# Patient Record
Sex: MALE | Race: OTHER | NOT HISPANIC OR LATINO
[De-identification: names, ages, dates, MRNs, and addresses within clinical notes are randomized per-mention and may not be internally consistent; named-entity substitution may affect disease eponyms.]

---

## 2017-02-08 ENCOUNTER — OTHER (OUTPATIENT)
Age: 82
End: 2017-02-08

## 2017-02-08 ENCOUNTER — APPOINTMENT (OUTPATIENT)
Dept: UROLOGY | Facility: CLINIC | Age: 82
End: 2017-02-08

## 2017-03-10 ENCOUNTER — OUTPATIENT (OUTPATIENT)
Dept: OUTPATIENT SERVICES | Facility: HOSPITAL | Age: 82
LOS: 1 days | Discharge: HOME | End: 2017-03-10

## 2017-03-10 DIAGNOSIS — Z02.1 ENCOUNTER FOR PRE-EMPLOYMENT EXAMINATION: ICD-10-CM

## 2017-03-11 LAB
VZV IGG FLD QL IA: 3071 INDEX — SIGNIFICANT CHANGE UP
VZV IGG FLD QL IA: POSITIVE — SIGNIFICANT CHANGE UP

## 2017-06-23 ENCOUNTER — OUTPATIENT (OUTPATIENT)
Dept: OUTPATIENT SERVICES | Facility: HOSPITAL | Age: 82
LOS: 1 days | End: 2017-06-23
Payer: MEDICARE

## 2017-06-23 ENCOUNTER — APPOINTMENT (OUTPATIENT)
Dept: UROLOGY | Facility: CLINIC | Age: 82
End: 2017-06-23

## 2017-06-23 DIAGNOSIS — R35.0 FREQUENCY OF MICTURITION: ICD-10-CM

## 2017-06-23 PROCEDURE — 76775 US EXAM ABDO BACK WALL LIM: CPT

## 2017-06-27 DIAGNOSIS — N20.0 CALCULUS OF KIDNEY: ICD-10-CM

## 2017-06-27 DIAGNOSIS — N40.1 BENIGN PROSTATIC HYPERPLASIA WITH LOWER URINARY TRACT SYMPTOMS: ICD-10-CM

## 2017-12-22 ENCOUNTER — APPOINTMENT (OUTPATIENT)
Dept: UROLOGY | Facility: CLINIC | Age: 82
End: 2017-12-22
Payer: MEDICARE

## 2017-12-22 ENCOUNTER — OUTPATIENT (OUTPATIENT)
Dept: OUTPATIENT SERVICES | Facility: HOSPITAL | Age: 82
LOS: 1 days | End: 2017-12-22
Payer: MEDICARE

## 2017-12-22 DIAGNOSIS — R35.0 FREQUENCY OF MICTURITION: ICD-10-CM

## 2017-12-22 PROCEDURE — 76775 US EXAM ABDO BACK WALL LIM: CPT | Mod: 26

## 2017-12-22 PROCEDURE — 99213 OFFICE O/P EST LOW 20 MIN: CPT | Mod: 25

## 2017-12-22 PROCEDURE — 76775 US EXAM ABDO BACK WALL LIM: CPT

## 2017-12-26 DIAGNOSIS — N20.0 CALCULUS OF KIDNEY: ICD-10-CM

## 2018-12-19 ENCOUNTER — APPOINTMENT (OUTPATIENT)
Dept: UROLOGY | Facility: CLINIC | Age: 83
End: 2018-12-19

## 2019-03-20 ENCOUNTER — APPOINTMENT (OUTPATIENT)
Dept: UROLOGY | Facility: CLINIC | Age: 84
End: 2019-03-20
Payer: MEDICARE

## 2019-03-20 ENCOUNTER — OUTPATIENT (OUTPATIENT)
Dept: OUTPATIENT SERVICES | Facility: HOSPITAL | Age: 84
LOS: 1 days | End: 2019-03-20
Payer: MEDICARE

## 2019-03-20 VITALS
DIASTOLIC BLOOD PRESSURE: 71 MMHG | HEIGHT: 67 IN | TEMPERATURE: 98 F | SYSTOLIC BLOOD PRESSURE: 167 MMHG | RESPIRATION RATE: 17 BRPM | HEART RATE: 51 BPM | WEIGHT: 140 LBS | BODY MASS INDEX: 21.97 KG/M2

## 2019-03-20 DIAGNOSIS — R35.0 FREQUENCY OF MICTURITION: ICD-10-CM

## 2019-03-20 PROCEDURE — 99213 OFFICE O/P EST LOW 20 MIN: CPT | Mod: 25

## 2019-03-20 PROCEDURE — 51798 US URINE CAPACITY MEASURE: CPT

## 2019-03-20 PROCEDURE — 76775 US EXAM ABDO BACK WALL LIM: CPT | Mod: 26

## 2019-03-20 PROCEDURE — 76775 US EXAM ABDO BACK WALL LIM: CPT

## 2019-03-20 NOTE — ADDENDUM
[FreeTextEntry1] :  I, Yazmin Rushing, acted solely as a scribe for Dr. Orville Olea. The documentation recorded by the scribe accurately reflects the service I personally performed and the decision by me.\par

## 2019-03-20 NOTE — HISTORY OF PRESENT ILLNESS
[FreeTextEntry1] : 83 year old male presents for evaluation of test results.\par Prostate volume in 2013 was 86 cc on MRI pelvis, but no nodules seen and his last PSA in 2016 was 7.42.\par In view of his age, we suggested we stop doing his PSA screenings unless he is symptomatic.\par Pt has a good stream, but some intermittency and occasional urge incontinence.\par However, he is not bothered by these symptoms.\par PVR today was 5 mL.\par \par On US renal today, pt has bilateral renal cysts of no significance. He also has some calcification on the left lower pole.\par LL from 03/09/19 showed elevated Ox levels, but otherwise, great results. \par Advised pt to reduce Ox intake.\par LL in 5 months and RTO in 6 months for results and US

## 2019-03-20 NOTE — ASSESSMENT
[FreeTextEntry1] : Advised pt to reduce Ox intake.\par LL in 5 months and RTO in 6 months for results and US\par

## 2019-03-25 DIAGNOSIS — N20.0 CALCULUS OF KIDNEY: ICD-10-CM

## 2019-03-25 DIAGNOSIS — N40.1 BENIGN PROSTATIC HYPERPLASIA WITH LOWER URINARY TRACT SYMPTOMS: ICD-10-CM

## 2019-06-06 ENCOUNTER — APPOINTMENT (OUTPATIENT)
Dept: UROLOGY | Facility: CLINIC | Age: 84
End: 2019-06-06
Payer: MEDICARE

## 2019-06-06 ENCOUNTER — OUTPATIENT (OUTPATIENT)
Dept: OUTPATIENT SERVICES | Facility: HOSPITAL | Age: 84
LOS: 1 days | End: 2019-06-06
Payer: MEDICARE

## 2019-06-06 DIAGNOSIS — R35.0 FREQUENCY OF MICTURITION: ICD-10-CM

## 2019-06-06 PROCEDURE — 99213 OFFICE O/P EST LOW 20 MIN: CPT | Mod: 25

## 2019-06-06 PROCEDURE — 76775 US EXAM ABDO BACK WALL LIM: CPT | Mod: 26

## 2019-06-06 PROCEDURE — 76775 US EXAM ABDO BACK WALL LIM: CPT

## 2019-06-06 NOTE — HISTORY OF PRESENT ILLNESS
[FreeTextEntry1] : 83 year old male presents for evaluation of test results.\par Prostate volume in 2013 was 86 cc on MRI pelvis, but no nodules seen and his last PSA in 2016 was 7.42. Repeated PSA at outside institution  was 8.55.\par In view of his age, we suggested we stop doing his PSA screenings unless he is symptomatic.\par US renal from 03/20/19 shows bilateral cysts, but no stones. Previously he had stones in his bladder.\par Review of LL from 03/09/19 shows elevated Ox levels, but otherwise great results.\par Pathology of previous prostatectomy  on 06/2008 showed no evidence of cancer present\par At the present time he has stress incontinence, his stream is good. He states that he has bilateral pain on shoulders and arms. \par Will repeat PSA\par Bone scan to see if there is evidence of cancer.

## 2019-06-06 NOTE — ASSESSMENT
[FreeTextEntry1] : Advised to repeat PSA\par Will have a bone scan to detect if there is any cancer present. \par

## 2019-06-06 NOTE — PHYSICAL EXAM
[Prostate Size ___ gm] : prostate size [unfilled] gm [No Prostate Nodules] : no prostate nodules [FreeTextEntry1] : smooth and regular

## 2019-06-07 ENCOUNTER — APPOINTMENT (OUTPATIENT)
Dept: NUCLEAR MEDICINE | Facility: IMAGING CENTER | Age: 84
End: 2019-06-07

## 2019-06-07 ENCOUNTER — OTHER (OUTPATIENT)
Age: 84
End: 2019-06-07

## 2019-06-10 LAB — PSA SERPL-MCNC: 7.22 NG/ML

## 2019-06-12 DIAGNOSIS — R97.20 ELEVATED PROSTATE SPECIFIC ANTIGEN [PSA]: ICD-10-CM

## 2019-06-20 ENCOUNTER — FORM ENCOUNTER (OUTPATIENT)
Age: 84
End: 2019-06-20

## 2019-06-21 ENCOUNTER — APPOINTMENT (OUTPATIENT)
Dept: NUCLEAR MEDICINE | Facility: IMAGING CENTER | Age: 84
End: 2019-06-21
Payer: MEDICARE

## 2019-06-21 ENCOUNTER — OUTPATIENT (OUTPATIENT)
Dept: OUTPATIENT SERVICES | Facility: HOSPITAL | Age: 84
LOS: 1 days | End: 2019-06-21
Payer: MEDICARE

## 2019-06-21 DIAGNOSIS — R97.20 ELEVATED PROSTATE SPECIFIC ANTIGEN [PSA]: ICD-10-CM

## 2019-06-21 PROCEDURE — A9561: CPT

## 2019-06-21 PROCEDURE — 78320: CPT | Mod: 26

## 2019-06-21 PROCEDURE — 78306 BONE IMAGING WHOLE BODY: CPT | Mod: 26

## 2019-06-21 PROCEDURE — 78306 BONE IMAGING WHOLE BODY: CPT

## 2019-06-21 PROCEDURE — 78803 RP LOCLZJ TUM SPECT 1 AREA: CPT

## 2019-06-21 PROCEDURE — 78999 UNLISTED MISC PX DX NUC MED: CPT

## 2019-06-26 ENCOUNTER — APPOINTMENT (OUTPATIENT)
Dept: UROLOGY | Facility: CLINIC | Age: 84
End: 2019-06-26
Payer: MEDICARE

## 2019-06-26 DIAGNOSIS — C61 MALIGNANT NEOPLASM OF PROSTATE: ICD-10-CM

## 2019-06-26 PROCEDURE — 99213 OFFICE O/P EST LOW 20 MIN: CPT

## 2019-06-26 NOTE — HISTORY OF PRESENT ILLNESS
[FreeTextEntry1] : 83 year old male presents for evaluation of test results.\par Prostate volume in 2013 was 86 cc on MRI pelvis, but no nodules seen\par PSA 7.22 on 06/06/19 and has been consistent since 2010.\par US renal from 03/20/19 shows bilateral cysts, but no stones. 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

## 2019-06-26 NOTE — ASSESSMENT
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

## 2019-09-19 ENCOUNTER — APPOINTMENT (OUTPATIENT)
Dept: UROLOGY | Facility: CLINIC | Age: 84
End: 2019-09-19
Payer: MEDICARE

## 2019-09-19 PROCEDURE — 99213 OFFICE O/P EST LOW 20 MIN: CPT

## 2019-09-19 NOTE — PHYSICAL EXAM
[General Appearance - Well Developed] : well developed [General Appearance - Well Nourished] : well nourished [Normal Appearance] : normal appearance [Well Groomed] : well groomed [General Appearance - In No Acute Distress] : no acute distress [Abdomen Soft] : soft [Abdomen Tenderness] : non-tender [Costovertebral Angle Tenderness] : no ~M costovertebral angle tenderness [Urethral Meatus] : meatus normal [Urinary Bladder Findings] : the bladder was normal on palpation [Scrotum] : the scrotum was normal [Testes Mass (___cm)] : there were no testicular masses [No Prostate Nodules] : no prostate nodules [Prostate Size ___ gm] : prostate size [unfilled] gm [Edema] : no peripheral edema [] : no respiratory distress [Respiration, Rhythm And Depth] : normal respiratory rhythm and effort [Exaggerated Use Of Accessory Muscles For Inspiration] : no accessory muscle use [Oriented To Time, Place, And Person] : oriented to person, place, and time [Affect] : the affect was normal [Mood] : the mood was normal [Not Anxious] : not anxious [Normal Station and Gait] : the gait and station were normal for the patient's age [No Focal Deficits] : no focal deficits [No Palpable Adenopathy] : no palpable adenopathy [FreeTextEntry1] : smooth and regular

## 2019-09-19 NOTE — HISTORY OF PRESENT ILLNESS
[FreeTextEntry1] : 83 year old male with CaP presents for evaluation of test results.\par Nocturia x3-4 and drinks a lot of coffee.\par \par Prostate volume in 2013 was 86 cc on MRI pelvis, but no nodules seen\par PSA 7.22 on 06/06/19 and has been consistent since 2010.\par US renal from 06/06/19 again visualized bilateral simple cysts the largest exophytic right upper pole measures 3.2 x 2.8 x 3.7 cm and multiple small scattered simple cysts in the left kidney the largest is medial mid and measures 2.0 x 1.7 cm. Again visualized a left kidney lower pole parenchymal calcification measuring 6.8 mm. Both kidneys are normal in size and echogenicity without obvious stones, hydronephrosis or solid masses visualized. 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 Overall pt is doing well. \par LL in 5 months and RTO in 6 months for results and US

## 2019-09-19 NOTE — ADDENDUM
[FreeTextEntry1] :  I, Yazmin Rushing, acted solely as a scribe for Dr. Orville Olea. The documentation recorded by the scribe accurately reflects the service I personally performed and the decision by me.

## 2019-09-19 NOTE — ASSESSMENT
[FreeTextEntry1] : Prostate volume in 2013 was 86 cc on MRI pelvis, but no nodules seen\par PSA 7.22 on 06/06/19 and has been consistent since 2010.\par US renal from 06/06/19 again visualized bilateral simple cysts the largest exophytic right upper pole measures 3.2 x 2.8 x 3.7 cm and multiple small scattered simple cysts in the left kidney the largest is medial mid and measures 2.0 x 1.7 cm. Again visualized a left kidney lower pole parenchymal calcification measuring 6.8 mm. Both kidneys are normal in size and echogenicity without obvious stones, hydronephrosis or solid masses visualized. 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 Overall pt is doing well. \par LL in 5 months and RTO in 6 months for results and US

## 2020-03-23 ENCOUNTER — APPOINTMENT (OUTPATIENT)
Dept: UROLOGY | Facility: CLINIC | Age: 85
End: 2020-03-23

## 2020-05-22 ENCOUNTER — APPOINTMENT (OUTPATIENT)
Dept: UROLOGY | Facility: CLINIC | Age: 85
End: 2020-05-22

## 2020-06-05 ENCOUNTER — APPOINTMENT (OUTPATIENT)
Dept: UROLOGY | Facility: CLINIC | Age: 85
End: 2020-06-05

## 2020-07-13 ENCOUNTER — APPOINTMENT (OUTPATIENT)
Dept: UROLOGY | Facility: CLINIC | Age: 85
End: 2020-07-13
Payer: MEDICARE

## 2020-07-13 ENCOUNTER — APPOINTMENT (OUTPATIENT)
Dept: UROLOGY | Facility: CLINIC | Age: 85
End: 2020-07-13

## 2020-07-13 PROCEDURE — 99442: CPT | Mod: 95

## 2020-08-06 LAB — PSA SERPL-MCNC: 7.42 NG/ML

## 2021-04-06 ENCOUNTER — APPOINTMENT (OUTPATIENT)
Dept: GASTROENTEROLOGY | Facility: CLINIC | Age: 86
End: 2021-04-06
Payer: MEDICARE

## 2021-04-06 VITALS
HEIGHT: 67 IN | WEIGHT: 135 LBS | DIASTOLIC BLOOD PRESSURE: 89 MMHG | BODY MASS INDEX: 21.19 KG/M2 | SYSTOLIC BLOOD PRESSURE: 158 MMHG | HEART RATE: 68 BPM

## 2021-04-06 DIAGNOSIS — K58.2 MIXED IRRITABLE BOWEL SYNDROME: ICD-10-CM

## 2021-04-06 PROCEDURE — 99212 OFFICE O/P EST SF 10 MIN: CPT

## 2021-04-06 NOTE — ASSESSMENT
[FreeTextEntry1] : 86 yo male with alternating constipation and diarrhea. Patient encouraged to continue current regimen and call with any concerns.

## 2021-04-06 NOTE — HISTORY OF PRESENT ILLNESS
[de-identified] : Mr. RAZA BECK is a 85 year old male with history of alternating constipation and diarrhea. Patient has been managing his symptoms with the use of p.r.n. MiraLax and Metamucil. There has been no bleeding or no other significant changes in medical history. Last colonoscopy was in 2018.\par

## 2021-06-10 LAB — PSA SERPL-MCNC: 7.25 NG/ML

## 2021-06-11 ENCOUNTER — APPOINTMENT (OUTPATIENT)
Dept: UROLOGY | Facility: CLINIC | Age: 86
End: 2021-06-11
Payer: MEDICARE

## 2021-06-11 ENCOUNTER — OUTPATIENT (OUTPATIENT)
Dept: OUTPATIENT SERVICES | Facility: HOSPITAL | Age: 86
LOS: 1 days | End: 2021-06-11
Payer: MEDICARE

## 2021-06-11 VITALS
DIASTOLIC BLOOD PRESSURE: 87 MMHG | WEIGHT: 135 LBS | RESPIRATION RATE: 17 BRPM | TEMPERATURE: 97.6 F | HEART RATE: 56 BPM | BODY MASS INDEX: 21.19 KG/M2 | SYSTOLIC BLOOD PRESSURE: 144 MMHG | HEIGHT: 67 IN

## 2021-06-11 PROCEDURE — 76775 US EXAM ABDO BACK WALL LIM: CPT | Mod: 26

## 2021-06-11 PROCEDURE — 76775 US EXAM ABDO BACK WALL LIM: CPT

## 2021-06-11 PROCEDURE — 99213 OFFICE O/P EST LOW 20 MIN: CPT

## 2021-06-11 NOTE — PHYSICAL EXAM
[General Appearance - Well Developed] : well developed [General Appearance - Well Nourished] : well nourished [Normal Appearance] : normal appearance [Well Groomed] : well groomed [General Appearance - In No Acute Distress] : no acute distress [Abdomen Soft] : soft [Abdomen Tenderness] : non-tender [Edema] : no peripheral edema [] : no respiratory distress [Respiration, Rhythm And Depth] : normal respiratory rhythm and effort [Exaggerated Use Of Accessory Muscles For Inspiration] : no accessory muscle use [Oriented To Time, Place, And Person] : oriented to person, place, and time [Affect] : the affect was normal [Mood] : the mood was normal [Not Anxious] : not anxious [Normal Station and Gait] : the gait and station were normal for the patient's age [No Focal Deficits] : no focal deficits [No Palpable Adenopathy] : no palpable adenopathy [No Prostate Nodules] : no prostate nodules [Prostate Size ___ gm] : prostate size [unfilled] gm [FreeTextEntry1] : The digital rectal exam was normal with a 50g firm prostate. There were no nodules.

## 2021-07-07 DIAGNOSIS — Z87.442 PERSONAL HISTORY OF URINARY CALCULI: ICD-10-CM

## 2021-07-07 DIAGNOSIS — C61 MALIGNANT NEOPLASM OF PROSTATE: ICD-10-CM

## 2021-07-29 ENCOUNTER — APPOINTMENT (OUTPATIENT)
Dept: OPHTHALMOLOGY | Facility: CLINIC | Age: 86
End: 2021-07-29
Payer: MEDICARE

## 2021-07-29 ENCOUNTER — NON-APPOINTMENT (OUTPATIENT)
Age: 86
End: 2021-07-29

## 2021-07-29 PROCEDURE — 92014 COMPRE OPH EXAM EST PT 1/>: CPT

## 2021-07-29 PROCEDURE — 92133 CPTRZD OPH DX IMG PST SGM ON: CPT

## 2021-11-08 LAB
PSA FREE FLD-MCNC: 29 %
PSA FREE SERPL-MCNC: 2.11 NG/ML
PSA SERPL-MCNC: 7.32 NG/ML

## 2021-11-16 ENCOUNTER — APPOINTMENT (OUTPATIENT)
Dept: UROLOGY | Facility: CLINIC | Age: 86
End: 2021-11-16
Payer: MEDICARE

## 2021-11-16 ENCOUNTER — OUTPATIENT (OUTPATIENT)
Dept: OUTPATIENT SERVICES | Facility: HOSPITAL | Age: 86
LOS: 1 days | End: 2021-11-16
Payer: MEDICARE

## 2021-11-16 DIAGNOSIS — Z87.442 PERSONAL HISTORY OF URINARY CALCULI: ICD-10-CM

## 2021-11-16 DIAGNOSIS — R35.0 FREQUENCY OF MICTURITION: ICD-10-CM

## 2021-11-16 PROCEDURE — 76775 US EXAM ABDO BACK WALL LIM: CPT

## 2021-11-16 PROCEDURE — 76775 US EXAM ABDO BACK WALL LIM: CPT | Mod: 26

## 2021-11-16 PROCEDURE — 99213 OFFICE O/P EST LOW 20 MIN: CPT

## 2021-11-16 NOTE — ASSESSMENT
[FreeTextEntry1] : The patient is an 86 year old male presenting today for a follow up visit for kidney stones. \par His PSA from 11/3/21 was 7.32 ng/mL. \par He reports that he has had occasional urge incontinence, but recently he reports he feels that he has been urinating better. \par He also notes that he has a good urinary stream and voids large volumes each time he passes urine. \par Admits to feelings of incomplete emptying. \par \par His PVR was 0 mL. \par \par The digital rectal exam showed a 50 g + prostate. There were no nodules.\par \par The [facility] from 10/26/21 showed very good results. \par \par His renal US from 11/16/2021 demonstrated:\par Right kidney: 9.8 x 5.4 x 4.9 cm \par Cortical Thickness: up 1.3 cm lp 1.5 cm \par \par Left kidney: 10.1 x 5.1 x 4.8 cm\par Cortical Thickness: up 1.4 cm lp 1.4 cm \par \par Echogenicity: Normal\par \par Bladder: Not scanned\par \par Findings: Previous;y visualized bilateral fulness or renal pelvises appears to be resolved. Again visualized bilateral non vascular cysts including a cyst with debris in the lower pole 1.6 x 1.3 cm and the largest cyst in the right kidney lateral upper 3.4 x 3.6 x 3.5 cm and largest in the left kidney medial anterior mid 2.6 x 2.5 x 2.5 cm. Both kidneys are normal in size and echogenicity without stones, hydronephrosis or solid masses visualized.\par \par I encouraged the patient to continue hydrating well. \par \par The patient will complete a LL in 5 months. \par He will have a renal US in 6 months. \par \par The patient will return to the office in 6 months. \par \par I spent 25 minutes with the patient.

## 2021-11-16 NOTE — PHYSICAL EXAM
[General Appearance - Well Developed] : well developed [General Appearance - Well Nourished] : well nourished [Normal Appearance] : normal appearance [Well Groomed] : well groomed [General Appearance - In No Acute Distress] : no acute distress [Abdomen Soft] : soft [Abdomen Tenderness] : non-tender [Edema] : no peripheral edema [] : no respiratory distress [Respiration, Rhythm And Depth] : normal respiratory rhythm and effort [Exaggerated Use Of Accessory Muscles For Inspiration] : no accessory muscle use [Oriented To Time, Place, And Person] : oriented to person, place, and time [Affect] : the affect was normal [Not Anxious] : not anxious [Mood] : the mood was normal [Normal Station and Gait] : the gait and station were normal for the patient's age [No Focal Deficits] : no focal deficits [No Palpable Adenopathy] : no palpable adenopathy [No Prostate Nodules] : no prostate nodules [Prostate Size ___ gm] : prostate size [unfilled] gm [FreeTextEntry1] : The digital rectal exam showed a 50 g + prostate. There were no nodules.

## 2021-11-16 NOTE — HISTORY OF PRESENT ILLNESS
[FreeTextEntry1] : The patient is an 86 year old male presenting today for a follow up visit for kidney stones. \par His PSA from 11/3/21 was 7.32 ng/mL. \par He reports that he has had occasional urge incontinence, but recently he reports he feels that he has been urinating better. \par He also notes that he has a good urinary stream and voids large volumes each time he passes urine. \par Admits to feelings of incomplete emptying. \par \par His PVR was 0 mL. \par \par The digital rectal exam showed a 50 g + prostate. There were no nodules.\par \par The Litholink from 10/26/21 showed very good results. \par \par His renal US from 11/16/2021 demonstrated:\par Right kidney: 9.8 x 5.4 x 4.9 cm \par Cortical Thickness: up 1.3 cm lp 1.5 cm \par \par Left kidney: 10.1 x 5.1 x 4.8 cm\par Cortical Thickness: up 1.4 cm lp 1.4 cm \par \par Echogenicity: Normal\par \par Bladder: Not scanned\par \par Findings: Previous;y visualized bilateral fulness or renal pelvises appears to be resolved. Again visualized bilateral non vascular cysts including a cyst with debris in the lower pole 1.6 x 1.3 cm and the largest cyst in the right kidney lateral upper 3.4 x 3.6 x 3.5 cm and largest in the left kidney medial anterior mid 2.6 x 2.5 x 2.5 cm. Both kidneys are normal in size and echogenicity without stones, hydronephrosis or solid masses visualized.\par \par I encouraged the patient to continue hydrating well. \par \par The patient will complete a LL in 5 months. \par He will have a renal US in 6 months. \par \par The patient will return to the office in 6 months.

## 2021-11-18 ENCOUNTER — APPOINTMENT (OUTPATIENT)
Dept: OPHTHALMOLOGY | Facility: CLINIC | Age: 86
End: 2021-11-18
Payer: MEDICARE

## 2021-11-18 ENCOUNTER — NON-APPOINTMENT (OUTPATIENT)
Age: 86
End: 2021-11-18

## 2021-11-18 PROCEDURE — 92012 INTRM OPH EXAM EST PATIENT: CPT

## 2022-05-08 LAB
PSA FREE FLD-MCNC: 29 %
PSA FREE SERPL-MCNC: 1.98 NG/ML
PSA SERPL-MCNC: 6.93 NG/ML

## 2022-05-23 ENCOUNTER — APPOINTMENT (OUTPATIENT)
Dept: UROLOGY | Facility: CLINIC | Age: 87
End: 2022-05-23
Payer: MEDICARE

## 2022-05-23 VITALS
WEIGHT: 133 LBS | OXYGEN SATURATION: 98 % | HEIGHT: 66 IN | HEART RATE: 56 BPM | BODY MASS INDEX: 21.38 KG/M2 | DIASTOLIC BLOOD PRESSURE: 80 MMHG | SYSTOLIC BLOOD PRESSURE: 130 MMHG

## 2022-05-23 DIAGNOSIS — N13.8 BENIGN PROSTATIC HYPERPLASIA WITH LOWER URINARY TRACT SYMPMS: ICD-10-CM

## 2022-05-23 DIAGNOSIS — R97.20 ELEVATED PROSTATE, SPECIFIC ANTIGEN [PSA]: ICD-10-CM

## 2022-05-23 DIAGNOSIS — N40.1 BENIGN PROSTATIC HYPERPLASIA WITH LOWER URINARY TRACT SYMPMS: ICD-10-CM

## 2022-05-23 DIAGNOSIS — N20.0 CALCULUS OF KIDNEY: ICD-10-CM

## 2022-05-23 PROCEDURE — 99214 OFFICE O/P EST MOD 30 MIN: CPT

## 2022-05-23 NOTE — HISTORY OF PRESENT ILLNESS
[FreeTextEntry1] : 85 yo M for follow up\par saw Dr. Olea so far\par \par history of nephrolithiasis \par last LL 5/5/2022 stable with good results\par last sono with Dr. Olea 11/2021: Findings: Previous;y visualized bilateral fulness or renal pelvises appears to be resolved. Again visualized bilateral non vascular cysts including a cyst with debris in the lower pole 1.6 x 1.3 cm and the largest cyst in the right kidney lateral upper 3.4 x 3.6 x 3.5 cm and largest in the left kidney medial anterior mid 2.6 x 2.5 x 2.5 cm. Both kidneys are normal in size and echogenicity without stones, hydronephrosis or solid masses visualized.\par \par now feeling well\par recent PSA (5/6/2022) stable 6.93 (free 29%)\par He reports that he has had occasional urge incontinence, but recently he reports he feels that he has been urinating better. He also notes that he has a good urinary stream and voids large volumes each time he passes urine.  Admits to feelings of incomplete emptying. \par \par residual before voiding 140 ml, no urge to void\par PVR 0 ml\par bladder capacity is not small, and it seems he can empty well. encouraged the patient not to hold his urine for too long to avoid increase pressure and urgency, also suggested timed voiding might help\par \par plan:\par - next visit in 6 months with new LL and office ultrasound

## 2022-06-20 ENCOUNTER — APPOINTMENT (OUTPATIENT)
Dept: OPHTHALMOLOGY | Facility: CLINIC | Age: 87
End: 2022-06-20
Payer: MEDICARE

## 2022-06-20 ENCOUNTER — NON-APPOINTMENT (OUTPATIENT)
Age: 87
End: 2022-06-20

## 2022-06-20 PROCEDURE — 92133 CPTRZD OPH DX IMG PST SGM ON: CPT

## 2022-06-20 PROCEDURE — 92014 COMPRE OPH EXAM EST PT 1/>: CPT

## 2022-07-11 ENCOUNTER — APPOINTMENT (OUTPATIENT)
Dept: OPHTHALMOLOGY | Facility: CLINIC | Age: 87
End: 2022-07-11

## 2022-07-11 ENCOUNTER — NON-APPOINTMENT (OUTPATIENT)
Age: 87
End: 2022-07-11

## 2022-07-11 PROCEDURE — 92083 EXTENDED VISUAL FIELD XM: CPT

## 2022-07-11 PROCEDURE — 92250 FUNDUS PHOTOGRAPHY W/I&R: CPT

## 2022-07-11 PROCEDURE — 92012 INTRM OPH EXAM EST PATIENT: CPT

## 2022-07-21 ENCOUNTER — APPOINTMENT (OUTPATIENT)
Dept: OPHTHALMOLOGY | Facility: CLINIC | Age: 87
End: 2022-07-21

## 2022-09-15 ENCOUNTER — NON-APPOINTMENT (OUTPATIENT)
Age: 87
End: 2022-09-15

## 2022-09-15 ENCOUNTER — APPOINTMENT (OUTPATIENT)
Dept: OPHTHALMOLOGY | Facility: CLINIC | Age: 87
End: 2022-09-15

## 2022-09-15 PROCEDURE — 92014 COMPRE OPH EXAM EST PT 1/>: CPT

## 2022-09-26 ENCOUNTER — APPOINTMENT (OUTPATIENT)
Dept: OPHTHALMOLOGY | Facility: CLINIC | Age: 87
End: 2022-09-26

## 2022-11-07 ENCOUNTER — RESULT CHARGE (OUTPATIENT)
Age: 87
End: 2022-11-07

## 2022-11-09 ENCOUNTER — APPOINTMENT (OUTPATIENT)
Dept: CARDIOLOGY | Facility: CLINIC | Age: 87
End: 2022-11-09
Payer: MEDICARE

## 2022-11-09 ENCOUNTER — NON-APPOINTMENT (OUTPATIENT)
Age: 87
End: 2022-11-09

## 2022-11-09 VITALS
OXYGEN SATURATION: 99 % | HEIGHT: 66 IN | DIASTOLIC BLOOD PRESSURE: 75 MMHG | WEIGHT: 130 LBS | BODY MASS INDEX: 20.89 KG/M2 | RESPIRATION RATE: 16 BRPM | SYSTOLIC BLOOD PRESSURE: 137 MMHG

## 2022-11-09 PROCEDURE — 99205 OFFICE O/P NEW HI 60 MIN: CPT

## 2022-11-09 PROCEDURE — 99245 OFF/OP CONSLTJ NEW/EST HI 55: CPT

## 2022-11-09 PROCEDURE — 99072 ADDL SUPL MATRL&STAF TM PHE: CPT

## 2022-11-09 PROCEDURE — 93000 ELECTROCARDIOGRAM COMPLETE: CPT

## 2022-11-17 DIAGNOSIS — R94.39 ABNORMAL RESULT OF OTHER CARDIOVASCULAR FUNCTION STUDY: ICD-10-CM

## 2022-11-17 NOTE — ASSESSMENT
[FreeTextEntry1] : A/P\par \par -[facility] requested including\par echo, stress test, labs, CV report \par -Attemptted to contact Dr. Carranza at [facility]  [phone]\par When he contacts me will discuss\par >abnormal results (area of ischemia on stress test)\par >if CT coronary angio would be reasonable alternative\par >if pt will be following up here or going back [facility] after CAD workup.\par \par -Return in 2 weeks to review records\par -Will consider cardiac cath v. CT coronary angio.\par Cath may be preferable given likelihood of high calcification\par due to age on coronary CT Angio.\par \par ============================\par 11/16/'22:\par \par [facility]:\par Jul-22-'22: "medium size area of mild ischemia involving\par inferior wall associated w/ small area of defect in apex\par suggestive of infarction w/ mild per-infarction ischemia.\par LV enlarged. 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 Rajat Carranza, [facility] [place]:\par -Months ago pt was diagnosed w/ afib.\par EF reduced then. Cardioverted but EF didn't improve.\par Ischemic workup w/ stress pos for inferior ischemia.\par -Dr. Carranza prefers cath for further evaluation\par as EF is substantially reduced & tehre is a high\par suspicion for obstructive CAD that may need revascularization.\par -[facility] does not [facility] medical care beyond cath\par here at [facility].  If pt wants to cont.\par care w/ us it will be outside [facility] coverage.\par -----------\par Will plan for cath here. Discussed w/ Dr. Min & contact scheduling staff.\par \par \par Dr. Monsalve's coontact info:\par cell \[facility] office

## 2022-11-22 ENCOUNTER — APPOINTMENT (OUTPATIENT)
Dept: CARDIOLOGY | Facility: CLINIC | Age: 87
End: 2022-11-22

## 2022-11-22 NOTE — H&P ADULT - NSICDXPASTMEDICALHX_GEN_ALL_CORE_FT
PAST MEDICAL HISTORY:  Afib     DM (diabetes mellitus)     HLD (hyperlipidemia)     HTN (hypertension)

## 2022-11-22 NOTE — H&P ADULT - NSHPPHYSICALEXAM_GEN_ALL_CORE
PHYSICAL EXAM  GENERAL: NAD, AAOx3  HEAD:  Atraumatic, Normocephalic  EYES: EOMI, PERRLA, conjunctiva and sclera clear  NECK: Supple, No JVD, No LAD  CHEST/LUNG: Clear to auscultation bilaterally; No wheeze  HEART: IRR; No murmurs, rubs, or gallops  ABDOMEN: Soft, Nontender, Nondistended; Bowel sounds present X 4 quadrants   EXTREMITIES:  1+ Peripheral Pulses, No clubbing, cyanosis, or edema  SKIN: No rashes or lesions,  b/l LE not red, cool to touch,  no open skin no drainage  NEURO: nonfocal CN/motor/sensory/reflexes  Psych: normal affect and behavior, calm and cooperative

## 2022-11-22 NOTE — H&P ADULT - ASSESSMENT
86 y/o M with PMHx of AFIB on coumadin last dose 11/18,  CHF EF 40-50%, HLD, DM, HTN presented to cardiology to [facility]. Stress test done suggestive of CAD and cardiomyopathy. Referred for cardiac cath to further evaluate     ASA class:  Creatinine:  GFR:  Bleeding  Risk score:  Bj Score:  86 y/o M with PMHx of AFIB on coumadin last dose 11/18,  CHF EF 29%, HLD, DM, HTN presented to cardiology to [facility]. Stress test done suggestive of CAD and cardiomyopathy. Referred for cardiac cath to further evaluate     ASA class: II  Creatinine: 0.93  GFR: 79  Bleeding  Risk score: 1.7%  Bj Score:  13 pts

## 2022-11-22 NOTE — H&P ADULT - HISTORY OF PRESENT ILLNESS
86 y/o M with PMHx of AFIB on coumadin last dose 11/18,  CHF EF 40-50%, HLD, DM, HTN presented to cardiology to [facility]. Stress test done suggestive of CAD and cardiomyopathy. Referred for cardiac cath to further evaluate  86 y/o M with PMHx of AFIB on coumadin last dose 11/18,  CHF EF 29%, HLD, DM, HTN presented to cardiology to [facility]. Stress test done suggestive of CAD and cardiomyopathy. Referred for cardiac cath to further evaluate   Covid 19 PCR neg

## 2022-11-22 NOTE — H&P ADULT - NSHPLABSRESULTS_GEN_ALL_CORE
ECHO 6/22- EF 40-45%.   STRESS 7/22- inferior wall ischemia.   EKG 11/9/22 AFIB 103 EKG 11/9/22 AFIB 103    STRESS 7/22- inferior wall ischemia. EF 29%  ECHO 6/22- EF 40-45%.

## 2022-11-22 NOTE — H&P ADULT - PROBLEM SELECTOR PLAN 1
-plan for cardiac cath for ischemic work up  - -Consent obtained for cardiac catheterization w/ coronary angiogram and possible stent placement. Pt is competent, has capacity, and understands risks and benefits of procedure. Risks and benefits discussed. Risk discussed included, but not limited to MI, stroke, mortality, major bleeding, arrythmia, or infection. All questions answered -plan for cardiac cath for ischemic work up  -No prehydration 2/2 reduced EF  - -Consent obtained for cardiac catheterization w/ coronary angiogram and possible stent placement. Pt is competent, has capacity, and understands risks and benefits of procedure. Risks and benefits discussed. Risk discussed included, but not limited to MI, stroke, mortality, major bleeding, arrythmia, or infection. All questions answered

## 2022-11-23 ENCOUNTER — OUTPATIENT (OUTPATIENT)
Dept: OUTPATIENT SERVICES | Facility: HOSPITAL | Age: 87
LOS: 1 days | Discharge: HOME | End: 2022-11-23
Payer: MEDICARE

## 2022-11-23 VITALS
HEART RATE: 97 BPM | SYSTOLIC BLOOD PRESSURE: 131 MMHG | DIASTOLIC BLOOD PRESSURE: 78 MMHG | HEIGHT: 66 IN | TEMPERATURE: 98 F | WEIGHT: 125 LBS | RESPIRATION RATE: 20 BRPM

## 2022-11-23 VITALS
RESPIRATION RATE: 17 BRPM | SYSTOLIC BLOOD PRESSURE: 115 MMHG | DIASTOLIC BLOOD PRESSURE: 72 MMHG | HEART RATE: 90 BPM | OXYGEN SATURATION: 97 %

## 2022-11-23 DIAGNOSIS — I21.4 NON-ST ELEVATION (NSTEMI) MYOCARDIAL INFARCTION: ICD-10-CM

## 2022-11-23 DIAGNOSIS — I10 ESSENTIAL (PRIMARY) HYPERTENSION: ICD-10-CM

## 2022-11-23 DIAGNOSIS — I25.10 ATHEROSCLEROTIC HEART DISEASE OF NATIVE CORONARY ARTERY WITHOUT ANGINA PECTORIS: ICD-10-CM

## 2022-11-23 DIAGNOSIS — R94.39 ABNORMAL RESULT OF OTHER CARDIOVASCULAR FUNCTION STUDY: ICD-10-CM

## 2022-11-23 LAB
ANION GAP SERPL CALC-SCNC: 12 MMOL/L
APTT 2H P 1:4 NP PPP: 35.9 SEC
APTT 2H P INC PPP: 33.7 SEC
APTT IMM NP/PRE NP PPP: 32.8 SEC
APTT INV RATIO PPP: 39.3 SEC
BASOPHILS # BLD AUTO: 0.04 K/UL
BASOPHILS NFR BLD AUTO: 0.6 %
BUN SERPL-MCNC: 20 MG/DL
CALCIUM SERPL-MCNC: 10.5 MG/DL
CHLORIDE SERPL-SCNC: 98 MMOL/L
CO2 SERPL-SCNC: 31 MMOL/L
CREAT SERPL-MCNC: 0.93 MG/DL
EGFR: 79 ML/MIN/1.73M2
EOSINOPHIL # BLD AUTO: 0.39 K/UL
EOSINOPHIL NFR BLD AUTO: 5.9 %
GLUCOSE SERPL-MCNC: 141 MG/DL
HCT VFR BLD CALC: 46.5 %
HGB BLD-MCNC: 15.6 G/DL
IMM GRANULOCYTES NFR BLD AUTO: 0.2 %
LYMPHOCYTES # BLD AUTO: 2.12 K/UL
LYMPHOCYTES NFR BLD AUTO: 32.3 %
MAN DIFF?: NORMAL
MCHC RBC-ENTMCNC: 31 PG
MCHC RBC-ENTMCNC: 33.5 GM/DL
MCV RBC AUTO: 92.3 FL
MONOCYTES # BLD AUTO: 0.58 K/UL
MONOCYTES NFR BLD AUTO: 8.8 %
NEUTROPHILS # BLD AUTO: 3.42 K/UL
NEUTROPHILS NFR BLD AUTO: 52.2 %
NPP NORMAL POOLED PLASMA: 32.7 SECS
PLATELET # BLD AUTO: 194 K/UL
POTASSIUM SERPL-SCNC: 4.6 MMOL/L
RBC # BLD: 5.04 M/UL
RBC # FLD: 14.1 %
SARS-COV-2 N GENE NPH QL NAA+PROBE: NOT DETECTED
SODIUM SERPL-SCNC: 141 MMOL/L
WBC # FLD AUTO: 6.56 K/UL

## 2022-11-23 PROCEDURE — C1894: CPT

## 2022-11-23 PROCEDURE — 93458 L HRT ARTERY/VENTRICLE ANGIO: CPT | Mod: 26

## 2022-11-23 PROCEDURE — C1769: CPT

## 2022-11-23 PROCEDURE — 93458 L HRT ARTERY/VENTRICLE ANGIO: CPT

## 2022-11-23 PROCEDURE — C1887: CPT

## 2022-11-23 RX ORDER — ENOXAPARIN SODIUM 100 MG/ML
60 INJECTION SUBCUTANEOUS
Qty: 0 | Refills: 0 | DISCHARGE

## 2022-11-23 RX ORDER — METFORMIN HYDROCHLORIDE 850 MG/1
500 TABLET ORAL
Qty: 0 | Refills: 0 | DISCHARGE

## 2022-11-23 RX ORDER — WARFARIN SODIUM 2.5 MG/1
0 TABLET ORAL
Qty: 0 | Refills: 0 | DISCHARGE

## 2022-11-23 RX ORDER — WARFARIN SODIUM 2.5 MG/1
5 TABLET ORAL
Qty: 0 | Refills: 0 | DISCHARGE

## 2022-11-23 NOTE — PACU DISCHARGE NOTE - COMMENTS
Verbal report given to Sobeida in CCU.  Right groin with no s/s of bleeding or hematoma.  Sent to CCU on Zoll monitor with nurse.

## 2022-11-23 NOTE — PROGRESS NOTE ADULT - SUBJECTIVE AND OBJECTIVE BOX
Nurse Practitioner Progress note:     HPI:  86 y/o M with PMHx of AFIB on coumadin last dose 11/18,  CHF EF 29%, HLD, DM, HTN presented to cardiology [facility]. Stress test done suggestive of CAD and cardiomyopathy. Referred for cardiac cath to further evaluate   Covid 19 PCR neg (22 Nov 2022 15:35)    S/P LHC revealing heavily calcified multi vessel disease. will need high rish PCI at later date        T(C): 36.8 (11-23-22 @ 13:16), Max: 36.8 (11-23-22 @ 13:16)  HR: 93 (11-23-22 @ 17:55) (93 - 107)  BP: 107/63 (11-23-22 @ 17:55) (107/63 - 131/78)  RR: 16 (11-23-22 @ 17:55) (16 - 20)  SpO2: 95% (11-23-22 @ 17:55) (95% - 98%)        PHYSICAL EXAM:  NEURO: Non-focal, AxOx3.  No neuro deficits NECK: Supple, No JVD, No LAD  CHEST/LUNG: Clear to auscultation bilaterally; No wheeze  HEART: s1 s2 Regular rate and rhythm; No murmurs, rubs, or gallops  ABDOMEN: Soft, Nontender, Nondistended; Bowel sounds present X 4 quadrants   EXTREMITIES:  2+ Peripheral Pulses, No clubbing, cyanosis, or edema   VASCULAR: Peripheral pulses palpable 2+ bilaterally  PROCEDURE SITE: RFA pulled post procedure ,Site is without hematoma or bleeding. Sensation and STERLING intact. Distal pulses palpable 2+, capillary refill < 2 seconds. Patient denies pain, numbness, tingling, CP or SOB. Clean dry dressing applied     PROCEDURE RESULTS: Full report to follow     ASSESSMENT: HPI:  86 y/o M with PMHx of AFIB on coumadin last dose 11/18,  CHF EF 29%, HLD, DM, HTN presented to cardiology [facility]. Stress test done suggestive of CAD and cardiomyopathy. Referred for cardiac cath to further evaluate   Covid 19 PCR neg (22 Nov 2022 15:35)    S/P LHC revealing heavily calcified multi vessel disease will need a high risk PCI     PLAN:  -VS, diet, activity as per post cath orders  -Encourage PO fluids  -no post IV hydration 2/2 reduced EF  -Continue current medications  -Resume Metformin on 11/25/22  -Resume Coumadin on 11/24/22  -Continue taking Lovenox 60mg for two days 11/24 and 11/25 then stop.  -Follow up with Dr. Min 11/28/22 to make arrangements for PCI   -Plan of care discussed with patient and Dr Min  -Post cath instructions reviewed, patient verbalizes and understands instructions  - Patient to be discharged home, recommend following up with cardiologist in 2 weeks

## 2022-11-29 PROBLEM — I10 ESSENTIAL (PRIMARY) HYPERTENSION: Chronic | Status: ACTIVE | Noted: 2022-11-22

## 2022-11-29 PROBLEM — I48.91 UNSPECIFIED ATRIAL FIBRILLATION: Chronic | Status: ACTIVE | Noted: 2022-11-22

## 2022-11-29 PROBLEM — E78.5 HYPERLIPIDEMIA, UNSPECIFIED: Chronic | Status: ACTIVE | Noted: 2022-11-22

## 2022-11-29 PROBLEM — E11.9 TYPE 2 DIABETES MELLITUS WITHOUT COMPLICATIONS: Chronic | Status: ACTIVE | Noted: 2022-11-22

## 2022-11-30 ENCOUNTER — NON-APPOINTMENT (OUTPATIENT)
Age: 87
End: 2022-11-30

## 2022-12-05 ENCOUNTER — INPATIENT (INPATIENT)
Facility: HOSPITAL | Age: 87
LOS: 0 days | Discharge: HOME | DRG: 247 | End: 2022-12-06
Attending: STUDENT IN AN ORGANIZED HEALTH CARE EDUCATION/TRAINING PROGRAM | Admitting: STUDENT IN AN ORGANIZED HEALTH CARE EDUCATION/TRAINING PROGRAM
Payer: COMMERCIAL

## 2022-12-05 ENCOUNTER — TRANSCRIPTION ENCOUNTER (OUTPATIENT)
Age: 87
End: 2022-12-05

## 2022-12-05 VITALS — HEIGHT: 66 IN | WEIGHT: 125 LBS

## 2022-12-05 DIAGNOSIS — R94.39 ABNORMAL RESULT OF OTHER CARDIOVASCULAR FUNCTION STUDY: ICD-10-CM

## 2022-12-05 DIAGNOSIS — Z98.890 OTHER SPECIFIED POSTPROCEDURAL STATES: Chronic | ICD-10-CM

## 2022-12-05 LAB
ANION GAP SERPL CALC-SCNC: 12 MMOL/L — SIGNIFICANT CHANGE UP (ref 5–17)
APTT BLD: 32.2 SEC — SIGNIFICANT CHANGE UP (ref 27.5–35.5)
BUN SERPL-MCNC: 21 MG/DL — SIGNIFICANT CHANGE UP (ref 7–23)
CALCIUM SERPL-MCNC: 10.2 MG/DL — SIGNIFICANT CHANGE UP (ref 8.4–10.5)
CHLORIDE SERPL-SCNC: 99 MMOL/L — SIGNIFICANT CHANGE UP (ref 96–108)
CO2 SERPL-SCNC: 26 MMOL/L — SIGNIFICANT CHANGE UP (ref 22–31)
CREAT SERPL-MCNC: 0.94 MG/DL — SIGNIFICANT CHANGE UP (ref 0.5–1.3)
EGFR: 78 ML/MIN/1.73M2 — SIGNIFICANT CHANGE UP
GLUCOSE BLDC GLUCOMTR-MCNC: 186 MG/DL — HIGH (ref 70–99)
GLUCOSE SERPL-MCNC: 119 MG/DL — HIGH (ref 70–99)
HCT VFR BLD CALC: 45.2 % — SIGNIFICANT CHANGE UP (ref 39–50)
HGB BLD-MCNC: 15 G/DL — SIGNIFICANT CHANGE UP (ref 13–17)
INR BLD: 1.43 RATIO — HIGH (ref 0.88–1.16)
MCHC RBC-ENTMCNC: 30.5 PG — SIGNIFICANT CHANGE UP (ref 27–34)
MCHC RBC-ENTMCNC: 33.2 GM/DL — SIGNIFICANT CHANGE UP (ref 32–36)
MCV RBC AUTO: 91.9 FL — SIGNIFICANT CHANGE UP (ref 80–100)
NRBC # BLD: 0 /100 WBCS — SIGNIFICANT CHANGE UP (ref 0–0)
PLATELET # BLD AUTO: 174 K/UL — SIGNIFICANT CHANGE UP (ref 150–400)
POTASSIUM SERPL-MCNC: 4.3 MMOL/L — SIGNIFICANT CHANGE UP (ref 3.5–5.3)
POTASSIUM SERPL-SCNC: 4.3 MMOL/L — SIGNIFICANT CHANGE UP (ref 3.5–5.3)
PROTHROM AB SERPL-ACNC: 16.6 SEC — HIGH (ref 10.5–13.4)
RBC # BLD: 4.92 M/UL — SIGNIFICANT CHANGE UP (ref 4.2–5.8)
RBC # FLD: 13.3 % — SIGNIFICANT CHANGE UP (ref 10.3–14.5)
SODIUM SERPL-SCNC: 137 MMOL/L — SIGNIFICANT CHANGE UP (ref 135–145)
WBC # BLD: 4.89 K/UL — SIGNIFICANT CHANGE UP (ref 3.8–10.5)
WBC # FLD AUTO: 4.89 K/UL — SIGNIFICANT CHANGE UP (ref 3.8–10.5)

## 2022-12-05 PROCEDURE — 92978 ENDOLUMINL IVUS OCT C 1ST: CPT | Mod: 26,RC

## 2022-12-05 PROCEDURE — 93010 ELECTROCARDIOGRAM REPORT: CPT | Mod: 76

## 2022-12-05 PROCEDURE — 92928 PRQ TCAT PLMT NTRAC ST 1 LES: CPT | Mod: RC

## 2022-12-05 RX ORDER — DEXTROSE 50 % IN WATER 50 %
25 SYRINGE (ML) INTRAVENOUS ONCE
Refills: 0 | Status: DISCONTINUED | OUTPATIENT
Start: 2022-12-05 | End: 2022-12-06

## 2022-12-05 RX ORDER — ATORVASTATIN CALCIUM 80 MG/1
10 TABLET, FILM COATED ORAL AT BEDTIME
Refills: 0 | Status: DISCONTINUED | OUTPATIENT
Start: 2022-12-05 | End: 2022-12-06

## 2022-12-05 RX ORDER — HEPARIN SODIUM 5000 [USP'U]/ML
2000 INJECTION INTRAVENOUS; SUBCUTANEOUS EVERY 6 HOURS
Refills: 0 | Status: DISCONTINUED | OUTPATIENT
Start: 2022-12-05 | End: 2022-12-06

## 2022-12-05 RX ORDER — DIGOXIN 250 MCG
125 TABLET ORAL DAILY
Refills: 0 | Status: DISCONTINUED | OUTPATIENT
Start: 2022-12-05 | End: 2022-12-06

## 2022-12-05 RX ORDER — INSULIN LISPRO 100/ML
VIAL (ML) SUBCUTANEOUS AT BEDTIME
Refills: 0 | Status: DISCONTINUED | OUTPATIENT
Start: 2022-12-05 | End: 2022-12-06

## 2022-12-05 RX ORDER — LISINOPRIL 2.5 MG/1
40 TABLET ORAL DAILY
Refills: 0 | Status: DISCONTINUED | OUTPATIENT
Start: 2022-12-05 | End: 2022-12-06

## 2022-12-05 RX ORDER — ENOXAPARIN SODIUM 100 MG/ML
60 INJECTION SUBCUTANEOUS
Qty: 0 | Refills: 0 | DISCHARGE

## 2022-12-05 RX ORDER — LISINOPRIL 2.5 MG/1
1 TABLET ORAL
Qty: 0 | Refills: 0 | DISCHARGE

## 2022-12-05 RX ORDER — WARFARIN SODIUM 2.5 MG/1
5 TABLET ORAL ONCE
Refills: 0 | Status: COMPLETED | OUTPATIENT
Start: 2022-12-05 | End: 2022-12-05

## 2022-12-05 RX ORDER — GLUCAGON INJECTION, SOLUTION 0.5 MG/.1ML
1 INJECTION, SOLUTION SUBCUTANEOUS ONCE
Refills: 0 | Status: DISCONTINUED | OUTPATIENT
Start: 2022-12-05 | End: 2022-12-06

## 2022-12-05 RX ORDER — METFORMIN HYDROCHLORIDE 850 MG/1
500 TABLET ORAL
Qty: 0 | Refills: 0 | DISCHARGE

## 2022-12-05 RX ORDER — CLOPIDOGREL BISULFATE 75 MG/1
75 TABLET, FILM COATED ORAL DAILY
Refills: 0 | Status: DISCONTINUED | OUTPATIENT
Start: 2022-12-06 | End: 2022-12-06

## 2022-12-05 RX ORDER — ASPIRIN/CALCIUM CARB/MAGNESIUM 324 MG
81 TABLET ORAL DAILY
Refills: 0 | Status: DISCONTINUED | OUTPATIENT
Start: 2022-12-06 | End: 2022-12-06

## 2022-12-05 RX ORDER — DIGOXIN 250 MCG
0.5 TABLET ORAL
Qty: 0 | Refills: 0 | DISCHARGE

## 2022-12-05 RX ORDER — AMLODIPINE BESYLATE 2.5 MG/1
10 TABLET ORAL DAILY
Refills: 0 | Status: DISCONTINUED | OUTPATIENT
Start: 2022-12-05 | End: 2022-12-06

## 2022-12-05 RX ORDER — DEXTROSE 50 % IN WATER 50 %
15 SYRINGE (ML) INTRAVENOUS ONCE
Refills: 0 | Status: DISCONTINUED | OUTPATIENT
Start: 2022-12-05 | End: 2022-12-06

## 2022-12-05 RX ORDER — HEPARIN SODIUM 5000 [USP'U]/ML
INJECTION INTRAVENOUS; SUBCUTANEOUS
Qty: 25000 | Refills: 0 | Status: DISCONTINUED | OUTPATIENT
Start: 2022-12-05 | End: 2022-12-06

## 2022-12-05 RX ORDER — INSULIN LISPRO 100/ML
VIAL (ML) SUBCUTANEOUS
Refills: 0 | Status: DISCONTINUED | OUTPATIENT
Start: 2022-12-05 | End: 2022-12-06

## 2022-12-05 RX ORDER — CLOPIDOGREL BISULFATE 75 MG/1
1 TABLET, FILM COATED ORAL
Qty: 90 | Refills: 0
Start: 2022-12-05 | End: 2023-03-04

## 2022-12-05 RX ORDER — SODIUM CHLORIDE 9 MG/ML
1000 INJECTION, SOLUTION INTRAVENOUS
Refills: 0 | Status: DISCONTINUED | OUTPATIENT
Start: 2022-12-05 | End: 2022-12-06

## 2022-12-05 RX ORDER — DEXTROSE 50 % IN WATER 50 %
12.5 SYRINGE (ML) INTRAVENOUS ONCE
Refills: 0 | Status: DISCONTINUED | OUTPATIENT
Start: 2022-12-05 | End: 2022-12-06

## 2022-12-05 RX ORDER — HEPARIN SODIUM 5000 [USP'U]/ML
4500 INJECTION INTRAVENOUS; SUBCUTANEOUS EVERY 6 HOURS
Refills: 0 | Status: DISCONTINUED | OUTPATIENT
Start: 2022-12-05 | End: 2022-12-06

## 2022-12-05 RX ORDER — WARFARIN SODIUM 2.5 MG/1
5 TABLET ORAL
Qty: 0 | Refills: 0 | DISCHARGE

## 2022-12-05 RX ADMIN — ATORVASTATIN CALCIUM 10 MILLIGRAM(S): 80 TABLET, FILM COATED ORAL at 23:03

## 2022-12-05 RX ADMIN — WARFARIN SODIUM 5 MILLIGRAM(S): 2.5 TABLET ORAL at 23:03

## 2022-12-05 RX ADMIN — HEPARIN SODIUM 1100 UNIT(S)/HR: 5000 INJECTION INTRAVENOUS; SUBCUTANEOUS at 23:03

## 2022-12-05 NOTE — DISCHARGE NOTE PROVIDER - NSDCFUADDINST_GEN_ALL_CORE_FT
Wound Care:   The day AFTER your procedure, please remove the bandage GENTLY, and clean using mild soap and gentle warm water stream, then pat dry. Leave OPEN to air. YOU MAY SHOWER.  DO NOT apply lotions, creams, ointments, powder, parfumes to your incision site  DO NOT SOAK/SUBMERGE your access site for 1 week ( no baths, no pools, no tubs, etc...)  Check  your groin and /or wrist daily. A small amount of bruising and soreness is normal.    ACTIVITY: **for 24 hours**   - DO NOT DRIVE  - DO NOT make any important decisions or sign legal documents   - DO NOT operate heavy machinaries   - you may resume sexual activity in 48 hours, unless otherwise instructed by your cardiologist     If your procedure was done through the WRIST:** for the NEXT 3 DAYS**  - avoid pushing, pulling, with that affected wrist   - avoid repeated movement of that hand and wrist ( eg: typing, hammering)  - DO NOT LIFT anything more than 5 lbs     If your procedure was done through the GROIN: **for the NEXT 5 DAYS**  - Limit climbing the stairs, DO NOT soak in bathtub or pool  - no strenuous activities, no pushing, no pulling, no straining  - Do not lift anything that is 10lbs or heavier     MEDICATION:   take your medications as explained (please see discharge paperwork)   If you received a STENT, you will be taking antiplatelet medications to KEEP YOUR STENT OPEN (eg: Aspirin, Plavix, Brilinta, Effient, etc).  Take as prescribed DO NOT STOP taking them without consulting with your cardiologist first.     Follow a heart healthy diet reccomended by your provider. If you smoke, please STOP SMOKING (You may call [phone] for center of tobacco control if you need assistance)     CALL your doctor/provider to make an appointment in 2 WEEKS     ***CALL YOUR PROVIDER***  if you experience: fever, chills, body aches, or severe pain, swelling, redness, heat or yellow discharge at incision site  If you experience bleeding or excruciating pain at the procedural site, swelling (golf ball size) at your procedural site  If you experience CHEST PAIN  If you experience extremity numbness, tingling, temperature change ( of your procedural site)   If you are unable to reach your doctor, you may contact:   Our Cardiology Office at [facility] at # [phone]   OR dial the number for [facility] # [phone]  OR dial the number for CRS # [phone]      Wound Care:   The day AFTER your procedure, please remove the bandage GENTLY, and clean using mild soap and gentle warm water stream, then pat dry. Leave OPEN to air. YOU MAY SHOWER.  DO NOT apply lotions, creams, ointments, powder, parfumes to your incision site  DO NOT SOAK/SUBMERGE your access site for 1 week ( no baths, no pools, no tubs, etc...)  Check  your groin and /or wrist daily. A small amount of bruising and soreness is normal.    ACTIVITY: **for 24 hours**   - DO NOT DRIVE  - DO NOT make any important decisions or sign legal documents   - DO NOT operate heavy machinaries   - you may resume sexual activity in 48 hours, unless otherwise instructed by your cardiologist     If your procedure was done through the WRIST:** for the NEXT 3 DAYS**  - avoid pushing, pulling, with that affected wrist   - avoid repeated movement of that hand and wrist ( eg: typing, hammering)  - DO NOT LIFT anything more than 5 lbs     If your procedure was done through the GROIN: **for the NEXT 5 DAYS**  - Limit climbing the stairs, DO NOT soak in bathtub or pool  - no strenuous activities, no pushing, no pulling, no straining  - Do not lift anything that is 10lbs or heavier     MEDICATION:   take your medications as explained (please see discharge paperwork)   If you received a STENT, you will be taking antiplatelet medications to KEEP YOUR STENT OPEN  (Plavix).  Take as prescribed DO NOT STOP taking them without consulting with your cardiologist first.     Follow a heart healthy diet recommended by your provider. If you smoke, please STOP SMOKING (You may call [phone] for center of tobacco control if you need assistance)     CALL your doctor/provider to make an appointment in 2 WEEKS     ***CALL YOUR PROVIDER***  if you experience: fever, chills, body aches, or severe pain, swelling, redness, heat or yellow discharge at incision site  If you experience bleeding or excruciating pain at the procedural site, swelling (golf ball size) at your procedural site  If you experience CHEST PAIN  If you experience extremity numbness, tingling, temperature change ( of your procedural site)   If you are unable to reach your doctor, you may contact:   Our Cardiology Office at [facility] at # [phone]   OR dial the number for [facility] # [phone]  OR dial the number for CRS # [phone]

## 2022-12-05 NOTE — H&P CARDIOLOGY - HISTORY OF PRESENT ILLNESS
86 y/o M with PMHx of AFIB on coumadin last dose ( bridged with Lovenox 60 SQ BID till  AM dose),  HLD, DM, HTN presents today for [facility] with PCI. Patient had diagnostic cath done at [facility] by Dr. Min on  revealing multivessel disease, and planned to do high risk PCI at tertiary center and referred to [facility]. Pt denies any implanted cardiac device.  Card: Dr. Carranza    Cath report from 22: EF 40%  Angiographic Findings  Cardiac Arteries and Lesion Findings  LMCA: Mild diffuse atherosclerosis  LAD:   Prox LAD: 70% stenosis 30 mm length.Pre procedure MAE III flow was   noted.   The lesion was diagnosed as a high risk lesion.The lesion was diffuse and   heavily calcified.The lesion showed with irregular contour, mild   angulation and mild tortuosity.  1st Dia% stenosis.Pre procedure MAE III flow was noted. The lesion   was diagnosed as a high risk lesion.The lesion was eccentric and heavily   calcified.The lesion showed mild tortuosity.  RCA:  Prox RCA: Ostial.70% stenosis.  Impression  Diagnostic Conclusions     1. Multivessel disease involving proximal LAD severe atherosclerosis with   D1 bifurcation as well as ostial RCA. Both heavily calcified. SYNTAX 19.   2. LVEDP 9 with LVEF 35-40%     Recommendations     1. Recommend further discussions with patient regarding high risk PCI vs.   CABG in patient with low EF, DM2, and multivessel disease.   2. If high risk PCI, plan to be completed at tertiary care center. In   meantime, patient to continue aspirin and bridge back to warfarin.   3. Findings discussed with patient and patient's cardiologist.     86 y/o M with PMHx of AFIB on coumadin last dose ( bridged with Lovenox 60 SQ BID till  AM ),  HLD, DM, HTN presents today for [facility] with PCI. Patient had diagnostic cath done at [facility] by Dr. Min on  revealing multivessel disease, and planned to do high risk PCI at tertiary center and referred to [facility]. Pt denies any implanted cardiac device.  Card: Dr. Carranza  Did not hydrate due to low EF  Cath report from 22: EF 40%  Angiographic Findings  Cardiac Arteries and Lesion Findings  LMCA: Mild diffuse atherosclerosis  LAD:   Prox LAD: 70% stenosis 30 mm length.Pre procedure MAE III flow was   noted.   The lesion was diagnosed as a high risk lesion.The lesion was diffuse and   heavily calcified.The lesion showed with irregular contour, mild   angulation and mild tortuosity.  1st Dia% stenosis.Pre procedure MAE III flow was noted. The lesion   was diagnosed as a high risk lesion.The lesion was eccentric and heavily   calcified.The lesion showed mild tortuosity.  RCA:  Prox RCA: Ostial.70% stenosis.  Impression  Diagnostic Conclusions     1. Multivessel disease involving proximal LAD severe atherosclerosis with   D1 bifurcation as well as ostial RCA. Both heavily calcified. SYNTAX 19.   2. LVEDP 9 with LVEF 35-40%     Recommendations     1. Recommend further discussions with patient regarding high risk PCI vs.   CABG in patient with low EF, DM2, and multivessel disease.   2. If high risk PCI, plan to be completed at tertiary care center. In   meantime, patient to continue aspirin and bridge back to warfarin.   3. Findings discussed with patient and patient's cardiologist.

## 2022-12-05 NOTE — DISCHARGE NOTE PROVIDER - NSDCMRMEDTOKEN_GEN_ALL_CORE_FT
amLODIPine 10 mg oral tablet: 1 tab(s) orally once a day  aspirin 81 mg oral tablet: 81 milligram(s) orally once a day  clopidogrel 75 mg oral tablet: 1 tab(s) orally once a day  Coumadin: 5mg  orally Monday through Friday  2.5mg sat/sun  Last dose on Thursday  digoxin 125 mcg (0.125 mg) oral tablet: 0.5 tab(s) orally once a day  hydroCHLOROthiazide 25 mg oral tablet: 1 tab(s) orally once a day  lisinopril 40 mg oral tablet: 1 tab(s) orally once a day  Lovenox 60 mg/0.6 mL injectable solution: injectable 2 times a day, last dose on Sunday morning  metFORMIN 500 mg oral tablet: 500 milligram(s) orally once a day. RESTART MEDICATION ON 12/8/2022  pravastatin 20 mg oral tablet: 1 tab(s) orally once a day   amLODIPine 5 mg oral tablet: 1 tab(s) orally once a day   clopidogrel 75 mg oral tablet: 1 tab(s) orally once a day  Coumadin: 5mg  orally Monday through Friday  2.5mg sat/sun  Last dose on Thursday  digoxin 125 mcg (0.125 mg) oral tablet: 0.5 tab(s) orally once a day  lisinopril 40 mg oral tablet: 1 tab(s) orally once a day  Lovenox 60 mg/0.6 mL injectable solution: injectable 2 times a day, last dose on Sunday morning  metFORMIN 500 mg oral tablet: 500 milligram(s) orally once a day. RESTART MEDICATION ON 12/8/2022  pravastatin 20 mg oral tablet: 1 tab(s) orally once a day   amLODIPine 5 mg oral tablet: 1 tab(s) orally once a day MDD:1  clopidogrel 75 mg oral tablet: 1 tab(s) orally once a day MDD:1  Coumadin: 5mg  orally Monday through Friday  2.5mg sat/sun  Last dose on Thursday  digoxin 125 mcg (0.125 mg) oral tablet: 0.5 tab(s) orally once a day  lisinopril 40 mg oral tablet: 1 tab(s) orally once a day  Lovenox 60 mg/0.6 mL injectable solution: injectable 2 times a day  metFORMIN 500 mg oral tablet: 500 milligram(s) orally once a day. RESTART MEDICATION ON 12/8/2022  pravastatin 20 mg oral tablet: 1 tab(s) orally once a day

## 2022-12-05 NOTE — DISCHARGE NOTE PROVIDER - HOSPITAL COURSE
HPI: 88 y/o M with PMHx of AFIB on coumadin last dose 12/1( bridged with Lovenox 60 SQ BID till sunday AM ),  HLD, DM, HTN presents today for [facility] with PCI. Patient had diagnostic cath done at [facility] by Dr. Min on 11/24 revealing multivessel disease, and planned to do high risk PCI at tertiary center and referred to [facility]. Pt denies any implanted cardiac device.    Card: Dr. Carranza    12/5: s/p LHC via RFA with FABIANO x 1 to ostial RCA. Access site is stable. Patient remains stable. HPI: 88 y/o M with PMHx of AFIB on coumadin last dose 12/1( bridged with Lovenox 60 SQ BID till sunday AM ),  HLD, DM, HTN presents today for [facility] with PCI. Patient had diagnostic cath done at [facility] by Dr. Min on 11/24 revealing multivessel disease, and planned to do high risk PCI at tertiary center and referred to [facility]. Pt denies any implanted cardiac device. s/p LHC via RFA with FABIANO x 1 to ostial RCA. Access site is stable. Patient remains stable on 12/5/22. Cardiac meds adjusted. Plans for staged PCI in 2 weeks HPI: 86 y/o M with PMHx of AFIB on coumadin last dose 12/1( bridged with Lovenox 60 SQ BID till sunday AM ),  HLD, DM, HTN presents today for [facility] with PCI. Patient had diagnostic cath done at [facility] by Dr. Min on 11/24 revealing multivessel disease, and planned to do high risk PCI at tertiary center and referred to [facility]. Pt denies any implanted cardiac device. 12/5  s/p LHC via RFA with FABIANO x 1 to ostial RCA.  RFA site is benign. No hematoma or bleeding +DP  Ambulating without complaint - no CP, SOB  Medications adjusted as d/w Dr Min ( see chart note)   Plans for staged PCI in 2 weeks

## 2022-12-05 NOTE — DISCHARGE NOTE PROVIDER - NSDCFUSCHEDAPPT_GEN_ALL_CORE_FT
Nba Hinson  [facility]  OPHTHALM [address]  Scheduled Appointment: 01/19/2023

## 2022-12-05 NOTE — DISCHARGE NOTE PROVIDER - NSDCCPCAREPLAN_GEN_ALL_CORE_FT
PRINCIPAL DISCHARGE DIAGNOSIS  Diagnosis: CAD (coronary artery disease)  Assessment and Plan of Treatment: Low salt, low fat diet.   Weight management.   Take medications as prescribed.    No smoking.  Follow up appointments with your doctor(s)  as instructed.      SECONDARY DISCHARGE DIAGNOSES  Diagnosis: HTN (hypertension)  Assessment and Plan of Treatment: Continue with your blood pressure medications; eat a heart healthy diet with low salt diet; exercise regularly (consult with your physician or cardiologist first); maintain a heart healthy weight; if you smoke - quit (A resource to help you stop smoking is the [facility] – phone number [phone].); include healthy ways to manage stress. Continue to follow with your primary care physician or cardiologist.    Diagnosis: HLD (hyperlipidemia)  Assessment and Plan of Treatment: Goal is to keep LDL<70. Continue with your cholesterol medications as prescribed. Eat a heart healthy diet that is low in saturated fats and salt, and includes whole grains, fruits, vegetables and lean protein; exercise regularly (consult with your physician or cardiologist first); maintain a heart healthy weight; if you smoke - quit (A resource to help you stop smoking is the [facility] – phone number [phone].). Continue to follow with your primary physician or cardiologist.    Diagnosis: Type 2 diabetes mellitus  Assessment and Plan of Treatment: Continue to follow with your primary care MD or your endocrinologist.  Follow a heart healthy diabetic diet. If you check your fingerstick glucose at home, call your MD if it is greater than 250mg/dL on 2 occasions or less than 100mg/dL on 2 occasions. Know signs of low blood sugar, such as: dizziness, shakiness, sweating, confusion, hunger, nervousness-drink 4 ounces apple juice if occurs and call your doctor. Know early signs of high blood sugar, such as: frequent urination, increased thirst, blurry vision, fatigue, headache - call your doctor if this occurs. Follow with other practitioners to care for your diabetes, such as ophthamologist and podiatrist.

## 2022-12-05 NOTE — DISCHARGE NOTE PROVIDER - CARE PROVIDER_API CALL
ELOINA Nubieber  Internal Medicine  [address] [place] [address] [address]  [place] [address]  Phone: ()-  Fax: ()-  Established Patient  Follow Up Time: 2 weeks   JOHN DUVALL  Internal Medicine  [address]  [place] [address]  Phone: ()-  Fax: ()-  Established Patient  Follow Up Time: 2 weeks    Nicho Min)  Cardiology; Internal Medicine; Interventional Cardiology  [address] [facility], [address]  [place], [place] [address]  Phone: [phone]  Fax: [phone]  Follow Up Time: 2 weeks

## 2022-12-05 NOTE — ASU PATIENT PROFILE, ADULT - FALL HARM RISK - UNIVERSAL INTERVENTIONS
Bed in lowest position, wheels locked, appropriate side rails in place/Call bell, personal items and telephone in reach/Instruct patient to call for assistance before getting out of bed or chair/Non-slip footwear when patient is out of bed/[place] to call system/Physically safe environment - no spills, clutter or unnecessary equipment/Purposeful Proactive Rounding/Room/bathroom lighting operational, light cord in reach

## 2022-12-05 NOTE — DISCHARGE NOTE PROVIDER - NSDCFUADDAPPT_GEN_ALL_CORE_FT
follow up at the clinic on 12/7 for INR check Please follow up at the [facility]  Please follow up for staged PCI in 2 weeks with Dr. Min.

## 2022-12-05 NOTE — PATIENT PROFILE ADULT - FALL HARM RISK - HARM RISK INTERVENTIONS

## 2022-12-05 NOTE — DISCHARGE NOTE PROVIDER - NSDCCPTREATMENT_GEN_ALL_CORE_FT
PRINCIPAL PROCEDURE  Procedure: Left heart cardiac cath  Findings and Treatment: s/p flower x 1 to ostial rca

## 2022-12-05 NOTE — CHART NOTE - NSCHARTNOTEFT_GEN_A_CORE
Removal of Femoral Sheath    Pulses in the right lower extremity are palpable. The patient was placed in the supine position. The insertion site was identified and the sutures were removed per protocol.  The 6 [place] femoral sheath was then removed. Direct pressure was applied for 20 minutes.     Monitoring of the right groin and both lower extremities including neuro-vascular checks and vital signs every 15 minutes x 4, then every 30 minutes x 2, then every 1 hour was ordered.    Complications: None/Other

## 2022-12-05 NOTE — DISCHARGE NOTE PROVIDER - PROVIDER TOKENS
PROVIDER:[TOKEN:[[id number]],FOLLOWUP:[2 weeks],ESTABLISHEDPATIENT:[T]] PROVIDER:[TOKEN:[[id number]],FOLLOWUP:[2 weeks],ESTABLISHEDPATIENT:[T]],PROVIDER:[TOKEN:[[id number]],FOLLOWUP:[2 weeks]]

## 2022-12-06 ENCOUNTER — TRANSCRIPTION ENCOUNTER (OUTPATIENT)
Age: 87
End: 2022-12-06

## 2022-12-06 VITALS
DIASTOLIC BLOOD PRESSURE: 90 MMHG | OXYGEN SATURATION: 99 % | HEART RATE: 94 BPM | TEMPERATURE: 98 F | RESPIRATION RATE: 16 BRPM | SYSTOLIC BLOOD PRESSURE: 107 MMHG

## 2022-12-06 LAB
ANION GAP SERPL CALC-SCNC: 12 MMOL/L — SIGNIFICANT CHANGE UP (ref 5–17)
APTT BLD: 85.5 SEC — HIGH (ref 27.5–35.5)
BUN SERPL-MCNC: 23 MG/DL — SIGNIFICANT CHANGE UP (ref 7–23)
CALCIUM SERPL-MCNC: 9.8 MG/DL — SIGNIFICANT CHANGE UP (ref 8.4–10.5)
CHLORIDE SERPL-SCNC: 100 MMOL/L — SIGNIFICANT CHANGE UP (ref 96–108)
CO2 SERPL-SCNC: 28 MMOL/L — SIGNIFICANT CHANGE UP (ref 22–31)
CREAT SERPL-MCNC: 0.94 MG/DL — SIGNIFICANT CHANGE UP (ref 0.5–1.3)
EGFR: 78 ML/MIN/1.73M2 — SIGNIFICANT CHANGE UP
GLUCOSE BLDC GLUCOMTR-MCNC: 127 MG/DL — HIGH (ref 70–99)
GLUCOSE BLDC GLUCOMTR-MCNC: 212 MG/DL — HIGH (ref 70–99)
GLUCOSE BLDC GLUCOMTR-MCNC: 97 MG/DL — SIGNIFICANT CHANGE UP (ref 70–99)
GLUCOSE SERPL-MCNC: 115 MG/DL — HIGH (ref 70–99)
HCT VFR BLD CALC: 42.7 % — SIGNIFICANT CHANGE UP (ref 39–50)
HGB BLD-MCNC: 14 G/DL — SIGNIFICANT CHANGE UP (ref 13–17)
INR BLD: 1.32 RATIO — HIGH (ref 0.88–1.16)
MAGNESIUM SERPL-MCNC: 1.9 MG/DL — SIGNIFICANT CHANGE UP (ref 1.6–2.6)
MCHC RBC-ENTMCNC: 29.9 PG — SIGNIFICANT CHANGE UP (ref 27–34)
MCHC RBC-ENTMCNC: 32.8 GM/DL — SIGNIFICANT CHANGE UP (ref 32–36)
MCV RBC AUTO: 91.2 FL — SIGNIFICANT CHANGE UP (ref 80–100)
NRBC # BLD: 0 /100 WBCS — SIGNIFICANT CHANGE UP (ref 0–0)
PLATELET # BLD AUTO: 168 K/UL — SIGNIFICANT CHANGE UP (ref 150–400)
POTASSIUM SERPL-MCNC: 4.2 MMOL/L — SIGNIFICANT CHANGE UP (ref 3.5–5.3)
POTASSIUM SERPL-SCNC: 4.2 MMOL/L — SIGNIFICANT CHANGE UP (ref 3.5–5.3)
PROTHROM AB SERPL-ACNC: 15.3 SEC — HIGH (ref 10.5–13.4)
RBC # BLD: 4.68 M/UL — SIGNIFICANT CHANGE UP (ref 4.2–5.8)
RBC # FLD: 13.4 % — SIGNIFICANT CHANGE UP (ref 10.3–14.5)
SODIUM SERPL-SCNC: 140 MMOL/L — SIGNIFICANT CHANGE UP (ref 135–145)
WBC # BLD: 6.46 K/UL — SIGNIFICANT CHANGE UP (ref 3.8–10.5)
WBC # FLD AUTO: 6.46 K/UL — SIGNIFICANT CHANGE UP (ref 3.8–10.5)

## 2022-12-06 PROCEDURE — 99231 SBSQ HOSP IP/OBS SF/LOW 25: CPT

## 2022-12-06 PROCEDURE — 93458 L HRT ARTERY/VENTRICLE ANGIO: CPT

## 2022-12-06 PROCEDURE — C9600: CPT

## 2022-12-06 PROCEDURE — 85730 THROMBOPLASTIN TIME PARTIAL: CPT

## 2022-12-06 PROCEDURE — 92978 ENDOLUMINL IVUS OCT C 1ST: CPT | Mod: RC

## 2022-12-06 PROCEDURE — C1894: CPT

## 2022-12-06 PROCEDURE — 83735 ASSAY OF MAGNESIUM: CPT

## 2022-12-06 PROCEDURE — 85610 PROTHROMBIN TIME: CPT

## 2022-12-06 PROCEDURE — C1769: CPT

## 2022-12-06 PROCEDURE — 93005 ELECTROCARDIOGRAM TRACING: CPT

## 2022-12-06 PROCEDURE — 80048 BASIC METABOLIC PNL TOTAL CA: CPT

## 2022-12-06 PROCEDURE — C1887: CPT

## 2022-12-06 PROCEDURE — 85027 COMPLETE CBC AUTOMATED: CPT

## 2022-12-06 PROCEDURE — 82962 GLUCOSE BLOOD TEST: CPT

## 2022-12-06 PROCEDURE — C1753: CPT

## 2022-12-06 PROCEDURE — C1874: CPT

## 2022-12-06 PROCEDURE — C1725: CPT

## 2022-12-06 RX ORDER — ENOXAPARIN SODIUM 100 MG/ML
60 INJECTION SUBCUTANEOUS
Qty: 1 | Refills: 0
Start: 2022-12-06 | End: 2023-01-04

## 2022-12-06 RX ORDER — CLOPIDOGREL BISULFATE 75 MG/1
1 TABLET, FILM COATED ORAL
Qty: 90 | Refills: 3
Start: 2022-12-06 | End: 2023-11-30

## 2022-12-06 RX ORDER — ENOXAPARIN SODIUM 100 MG/ML
0 INJECTION SUBCUTANEOUS
Qty: 0 | Refills: 0 | DISCHARGE

## 2022-12-06 RX ORDER — AMLODIPINE BESYLATE 2.5 MG/1
1 TABLET ORAL
Qty: 30 | Refills: 0
Start: 2022-12-06 | End: 2023-01-04

## 2022-12-06 RX ORDER — ENOXAPARIN SODIUM 100 MG/ML
60 INJECTION SUBCUTANEOUS EVERY 12 HOURS
Refills: 0 | Status: DISCONTINUED | OUTPATIENT
Start: 2022-12-06 | End: 2022-12-06

## 2022-12-06 RX ORDER — ASPIRIN/CALCIUM CARB/MAGNESIUM 324 MG
81 TABLET ORAL
Qty: 0 | Refills: 0 | DISCHARGE

## 2022-12-06 RX ORDER — AMLODIPINE BESYLATE 2.5 MG/1
1 TABLET ORAL
Qty: 0 | Refills: 0 | DISCHARGE

## 2022-12-06 RX ORDER — SIMETHICONE 80 MG/1
80 TABLET, CHEWABLE ORAL ONCE
Refills: 0 | Status: COMPLETED | OUTPATIENT
Start: 2022-12-06 | End: 2022-12-06

## 2022-12-06 RX ORDER — ENOXAPARIN SODIUM 100 MG/ML
60 INJECTION SUBCUTANEOUS
Qty: 0 | Refills: 0 | DISCHARGE

## 2022-12-06 RX ORDER — AMLODIPINE BESYLATE 2.5 MG/1
5 TABLET ORAL ONCE
Refills: 0 | Status: COMPLETED | OUTPATIENT
Start: 2022-12-06 | End: 2022-12-06

## 2022-12-06 RX ORDER — HYDROCHLOROTHIAZIDE 25 MG
1 TABLET ORAL
Qty: 0 | Refills: 0 | DISCHARGE

## 2022-12-06 RX ADMIN — CLOPIDOGREL BISULFATE 75 MILLIGRAM(S): 75 TABLET, FILM COATED ORAL at 04:25

## 2022-12-06 RX ADMIN — LISINOPRIL 40 MILLIGRAM(S): 2.5 TABLET ORAL at 04:25

## 2022-12-06 RX ADMIN — ENOXAPARIN SODIUM 60 MILLIGRAM(S): 100 INJECTION SUBCUTANEOUS at 10:25

## 2022-12-06 RX ADMIN — Medication 2: at 11:34

## 2022-12-06 RX ADMIN — AMLODIPINE BESYLATE 5 MILLIGRAM(S): 2.5 TABLET ORAL at 13:11

## 2022-12-06 RX ADMIN — HEPARIN SODIUM 1100 UNIT(S)/HR: 5000 INJECTION INTRAVENOUS; SUBCUTANEOUS at 06:00

## 2022-12-06 RX ADMIN — Medication 81 MILLIGRAM(S): at 04:25

## 2022-12-06 RX ADMIN — Medication 125 MICROGRAM(S): at 04:25

## 2022-12-06 RX ADMIN — SIMETHICONE 80 MILLIGRAM(S): 80 TABLET, CHEWABLE ORAL at 04:26

## 2022-12-06 NOTE — CHART NOTE - NSCHARTNOTEFT_GEN_A_CORE
Medications, vitals, clinical status reviewed with Dr. Min.    Triple therapy recommendation:   - Continue with Lovenox 60mg BID and Coumadin 5mg M-F and 2.5 Sat/Sun qhs until reaching to therapeutic INR   - INR check on Thursday    - Continue with Plavix 75 qd    - Received 2 days of ASA. No further ASA starting tomorrow    Other cardiac meds recommendation   - Stop HCTZ 25mg    - Reduce dose of Amlodipine from 10mg to 5mg qd   - Continue with Lisinopril 40 qd and Digoxin 125 qd    - Continue with Pravastatin 20 qhs for now - possible up-titration as opt     Patient educated on the new medications regimen and followup plans. He verbalized understanding. Medications, vitals, clinical status reviewed with Dr. Min.    Triple therapy recommendation:   - Continue with Lovenox 60mg BID and Coumadin 5mg M-F and 2.5 Sat/Sun qhs until reaching to therapeutic INR   - INR check on Thursday    - Continue with Plavix 75 qd    - Received 2 days of ASA. No further ASA starting tomorrow    Other cardiac meds recommendation   - Stop HCTZ 25mg    - Reduce dose of Amlodipine from 10mg to 5mg qd   - Continue with Lisinopril 40 qd and Digoxin 0.5 tab of 125 qd    - Continue with Pravastatin 20 qhs for now - possible up-titration as opt     Patient educated on the new medications regimen and followup plans. He verbalized understanding.

## 2022-12-06 NOTE — PROGRESS NOTE ADULT - ASSESSMENT
86 y/o M with PMHx of AFIB on coumadin last dose 12/1( bridged with Lovenox 60 SQ BID till sunday AM ),  HLD, DM, HTN presents today for [facility] with PCI. Patient had diagnostic cath done at [facility] by Dr. Min on 11/24 revealing multivessel disease, and planned to do high risk PCI at tertiary center and referred to [facility]. Pt denies any implanted cardiac device.  Card: Dr. Carranza  Did not hydrate due to low EF      s/p cardiac cath on 12/5 with stent to ostial RCA via RFA    IF groin:  right groin w/o bleeding or hematoma.  soft, non tender.  Pulses in the right lower extremity are palpable.   Denies chest pain, denies groin/leg/foot: pain, numbness or tingling       - Reviewed and reinforced with patient:  wound care instructions, activities dos and donts, medication compliance specifically antiplatelet therapy given stent/s.    - Patient aware to take DAPT  as prescribed and DO NOT STOP taking without consulting cardiologist first or STENT/s WILL CLOSE  - Reviewed and reinforced with patient:  site complications ( eg: bleeding, excruciating pain at the procedural site, large swelling (golf ball sized), extremity numbness, tingling, temperature change), or CHEST PAIN; pt aware that if any of those occur he/she must call cardiologist IMMEDIATELY or 911 or go to nearest emergency room   - Reviewed and reinforced a heart healthy diet, Smoking Cessation  - Patient verbalizes understanding of ALL OF THE ABOVE, and gives positive feedback     Plan:  - [facility]  - discharged home with aspirin, plavix  - on heparin drip overnight, d/c home on lovenox as per Dr. Min   - 5mg coumadin given on 12/5 at 10pm, check INR with AM labs  - cont statin   - Keep Mg >2 K >4    Other recommendations:  f/u appt in 2 weeks post dc with oupt cardiologist  for all  general cardiology questions please contact patient's primary cards team   all other care as per primary medicine  team       GARCIA Fisher  Interventional Cardiology   [phone]               88 y/o M with PMHx of AFIB on coumadin last dose 12/1( bridged with Lovenox 60 SQ BID till sunday AM ),  HLD, DM, HTN presents today for [facility] with PCI. Patient had diagnostic cath done at [facility] by Dr. Min on 11/24 revealing multivessel disease, and planned to do high risk PCI at tertiary center and referred to [facility]. Pt denies any implanted cardiac device.  Card: Dr. Carranza  Did not hydrate due to low EF      s/p cardiac cath on 12/5 with stent to ostial RCA via RFA    IF groin:  right groin w/o bleeding or hematoma.  soft, non tender.  Pulses in the right lower extremity are palpable.   Denies chest pain, denies groin/leg/foot: pain, numbness or tingling       - Reviewed and reinforced with patient:  wound care instructions, activities dos and donts, medication compliance specifically antiplatelet therapy given stent/s.    - Patient aware to take DAPT  as prescribed and DO NOT STOP taking without consulting cardiologist first or STENT/s WILL CLOSE  - Reviewed and reinforced with patient:  site complications ( eg: bleeding, excruciating pain at the procedural site, large swelling (golf ball sized), extremity numbness, tingling, temperature change), or CHEST PAIN; pt aware that if any of those occur he/she must call cardiologist IMMEDIATELY or 911 or go to nearest emergency room   - Reviewed and reinforced a heart healthy diet, Smoking Cessation  - Patient verbalizes understanding of ALL OF THE ABOVE, and gives positive feedback     Plan:  - [facility]  - discharge home with aspirin 81, plavix 75 as per Dr. Min   - on heparin drip overnight, d/c home on lovenox as per Dr. Min   - 5mg coumadin given on 12/5 at 10pm, check INR with AM labs  - cont statin   - Keep Mg >2 K >4    Other recommendations:  f/u appt in 2 weeks post dc with oupt cardiologist  for all  general cardiology questions please contact patient's primary cards team   all other care as per primary medicine  team       GARCIA Fisher  Interventional Cardiology   [phone]

## 2022-12-06 NOTE — DISCHARGE NOTE NURSING/CASE MANAGEMENT/SOCIAL WORK - NSDCFUADDAPPT_GEN_ALL_CORE_FT
Please follow up at the clinic on 12[facility]  Please follow up for staged PCI in 2 weeks with Dr. Min.

## 2022-12-06 NOTE — DISCHARGE NOTE NURSING/CASE MANAGEMENT/SOCIAL WORK - PATIENT PORTAL LINK FT
You can access the FollowMyHealth Patient Portal offered by [facility] by registering at the following website: [email or web address]. By joining [employer]’s FollowMyHealth portal, you will also be able to view your health information using other applications (apps) compatible with our system.

## 2022-12-06 NOTE — PROGRESS NOTE ADULT - SUBJECTIVE AND OBJECTIVE BOX
[facility] INVASIVE CARDIOLOGY- (Sandy Batista, Isak, Shyam, Tsering, Carlos, Pako, Félix, Tigre)   CARDIAC [facility], [facility] TEAM   [phone]      CHIEF COMPLAINT: Patient is a 87y old  Male who presents with a chief complaint of high risk PCI (05 Dec 2022 19:22)      HPI:  88 y/o M with PMHx of AFIB on coumadin last dose ( bridged with Lovenox 60 SQ BID till  ),  HLD, DM, HTN presents today for [facility] with PCI. Patient had diagnostic cath done at [facility] by Dr. Min on  revealing multivessel disease, and planned to do high risk PCI at tertiary center and referred to [facility]. Pt denies any implanted cardiac device.  Card: Dr. Carranza  Did not hydrate due to low EF  Cath report from 22: EF 40%  Angiographic Findings  Cardiac Arteries and Lesion Findings  LMCA: Mild diffuse atherosclerosis  LAD:   Prox LAD: 70% stenosis 30 mm length.Pre procedure MAE III flow was   noted.   The lesion was diagnosed as a high risk lesion.The lesion was diffuse and   heavily calcified.The lesion showed with irregular contour, mild   angulation and mild tortuosity.  1st Dia% stenosis.Pre procedure MAE III flow was noted. The lesion   was diagnosed as a high risk lesion.The lesion was eccentric and heavily   calcified.The lesion showed mild tortuosity.  RCA:  Prox RCA: Ostial.70% stenosis.  Impression  Diagnostic Conclusions     1. Multivessel disease involving proximal LAD severe atherosclerosis with   D1 bifurcation as well as ostial RCA. Both heavily calcified. SYNTAX 19.   2. LVEDP 9 with LVEF 35-40%     Recommendations     1. Recommend further discussions with patient regarding high risk PCI vs.   CABG in patient with low EF, DM2, and multivessel disease.   2. If high risk PCI, plan to be completed at tertiary care center. In   meantime, patient to continue aspirin and bridge back to warfarin.   3. Findings discussed with patient and patient's cardiologist.     (05 Dec 2022 11:57)        Subjective/Observations: Patient seen and examined thoroughly.  Patient denies chest pain, dyspena, palpitations, dizziness, headache, nausea, and vomiting.      Review of Systems all WNL except below indicated:    Constitutional: [ ] Fever [ ] Chills [ ] Fatigue [ ] Weight change   HEENT: [ ] Blurred vision [ ] Eye Pain [ ] Headache [ ] Runny nose [ ] Sore Throat   Respiratory: [ ] Cough [ ] Wheezing [ ] Shortness of breath  Cardiovascular: [ ] Chest Pain [ ] Palpitations [ ] MCKINNON [ ] PND [ ] Orthopnea  Gastrointestinal: [ ] Abdominal Pain [ ] Diarrhea [ ] Constipation [ ] Hemorrhoids [ ] Nausea [ ] Vomiting  Genitourinary: [ ] Nocturia [ ] Dysuria [ ] Incontinence  Extremities: [ ] Swelling [ ] Joint Pain  Neurologic: [ ] Focal deficit [ ] Paresthesias [ ] Syncope  Lymphatic: [ ] Swelling [ ] Lymphadenopathy   Skin: [ ] Rash [ ] Ecchymoses [ ] Wounds [ ] Lesions  Psychiatry: [ ] Depression [ ] Suicidal/Homicidal Ideation [ ] Anxiety [ ] Sleep Disturbances  [x] 10 point review of systems is otherwise negative except as mentioned above            [ ]Unable to obtain    PAST MEDICAL & SURGICAL HISTORY:  Afib      HTN (hypertension)      DM (diabetes mellitus)      HLD (hyperlipidemia)      S/P ear surgery      H/O hernia repair          MEDICATIONS  (STANDING):  amLODIPine   Tablet 10 milliGRAM(s) Oral daily  aspirin enteric coated 81 milliGRAM(s) Oral daily  atorvastatin 10 milliGRAM(s) Oral at bedtime  clopidogrel Tablet 75 milliGRAM(s) Oral daily  dextrose 5%. 1000 milliLiter(s) (100 mL/Hr) IV Continuous <Continuous>  dextrose 5%. 1000 milliLiter(s) (50 mL/Hr) IV Continuous <Continuous>  dextrose 50% Injectable 25 Gram(s) IV Push once  dextrose 50% Injectable 12.5 Gram(s) IV Push once  dextrose 50% Injectable 25 Gram(s) IV Push once  digoxin     Tablet 125 MICROGram(s) Oral daily  glucagon  Injectable 1 milliGRAM(s) IntraMuscular once  heparin  Infusion.  Unit(s)/Hr (11 mL/Hr) IV Continuous <Continuous>  hydrochlorothiazide 25 milliGRAM(s) Oral daily  insulin lispro (ADMELOG) corrective regimen sliding scale   SubCutaneous three times a day before meals  insulin lispro (ADMELOG) corrective regimen sliding scale   SubCutaneous at bedtime  lisinopril 40 milliGRAM(s) Oral daily    MEDICATIONS  (PRN):  dextrose Oral Gel 15 Gram(s) Oral once PRN Blood Glucose LESS THAN 70 milliGRAM(s)/deciliter  heparin   Injectable 4500 Unit(s) IV Push every 6 hours PRN For aPTT less than 40  heparin   Injectable 2000 Unit(s) IV Push every 6 hours PRN For aPTT between 40 - 57      Allergies    penicillin (Anaphylaxis)    Intolerances          Vital Signs Last 24 Hrs  T(C): 36.8 (05 Dec 2022 15:35), Max: 36.8 (05 Dec 2022 15:35)  T(F): 98.3 (05 Dec 2022 15:35), Max: 98.3 (05 Dec 2022 15:35)  HR: 87 (05 Dec 2022 21:45) (80 - 115)  BP: 95/59 (05 Dec 2022 21:45) (84/61 - 127/74)  BP(mean): 67 (05 Dec 2022 21:45) (67 - 84)  RR: 18 (05 Dec 2022 21:45) (15 - 18)  SpO2: 98% (05 Dec 2022 21:45) (94% - 100%)    Parameters below as of 05 Dec 2022 21:45  Patient On (Oxygen Delivery Method): room air        I&O's Summary    05 Dec 2022 07:01  -  06 Dec 2022 04:31  --------------------------------------------------------  IN: 0 mL / OUT: 400 mL / NET: -400 mL      Weight (kg): 56.7 (12-05 @ 11:57)    FOCUSED PHYSICAL EXAM:  Pulmonary: Non-labored, breath sounds are clear bilaterally, No wheezing, rales or rhonchi  Cardiovascular: Regular, S1 and S2, No murmurs, rubs, gallops or clicks  cath site: right groin stable w/o bleeding or hematoma, soft, + pulses     LABS: All Labs Reviewed:                        15.0   4.89  )-----------( 174      ( 05 Dec 2022 12:18 )             45.2     05 Dec 2022 12:18    137    |  99     |  21     ----------------------------<  119    4.3     |  26     |  0.94     Ca    10.2       05 Dec 2022 12:18      PT/INR - ( 05 Dec 2022 13:22 )   PT: 16.6 sec;   INR: 1.43 ratio         PTT - ( 05 Dec 2022 13:22 )  PTT:32.2 sec          RESULTS:    CATH REPORT:  22   Diagnostic Conclusions     1. Multivessel disease involving proximal LAD severe atherosclerosis with   D1 bifurcation as well as ostial RCA. Both heavily calcified. SYNTAX 19.   2. LVEDP 9 with LVEF 35-40%     Recommendations     1. Recommend further discussions with patient regarding high risk PCI vs.   CABG in patient with low EF, DM2, and multivessel disease.   2. If high risk PCI, plan to be completed at tertiary care center. In   meantime, patient to continue aspirin and bridge back to warfarin.   3. Findings discussed with patient and patient's cardiologist.    Estimated Blood Loss:5ml.     Signatures     ----------------------------------------------------------------   Electronically signed by MD Nicho Min(Performing Physician)   on 2022 05:44   ----------------------------------------------------------------    Discharge Data  Discharge Date: 2022

## 2022-12-20 ENCOUNTER — APPOINTMENT (OUTPATIENT)
Dept: CARDIOLOGY | Facility: CLINIC | Age: 87
End: 2022-12-20

## 2022-12-20 VITALS
HEIGHT: 66 IN | DIASTOLIC BLOOD PRESSURE: 78 MMHG | HEART RATE: 66 BPM | WEIGHT: 125 LBS | SYSTOLIC BLOOD PRESSURE: 124 MMHG | OXYGEN SATURATION: 99 % | BODY MASS INDEX: 20.09 KG/M2

## 2022-12-20 PROCEDURE — 99215 OFFICE O/P EST HI 40 MIN: CPT

## 2022-12-20 PROCEDURE — 93000 ELECTROCARDIOGRAM COMPLETE: CPT

## 2022-12-20 NOTE — HISTORY OF PRESENT ILLNESS
[FreeTextEntry1] : 86 y/o M with PMHx of AFIB on coumadin last dose 12/1( bridged with Lovenox 60 SQ BID till sunday AM ),  HLD, DM, HTN presents today for follow up after recent high risk PCi to ostial RCA (which was what was signifcant on NST). Continues to have no chest pain after the procedure and is able to do all activities without any issues. Takng clopidogrel and Coumadin now. INR 2 today. \par Went to coumadin clinic this morning.

## 2022-12-20 NOTE — DISCUSSION/SUMMARY
[FreeTextEntry1] : 86 y/o M with PMHx of AFIB on coumadin last dose 12/1( bridged with Lovenox 60 SQ BID till sunday AM ),  HLD, DM, HTN with recent osital RCA PCi. Doing well without any chest pain and access site well healed. Patient to continue clopidogrel 75 mg daily and Coumadin 5 mg daily. Plan for medical management of LAD at this time given no ischemia over that area on recent NST and no further symptoms. If develop chest pain on optimal medical management, may consider LAD intervention. Patient to follow with cardiology Dr. Lopez and Dr. Monsalve. \par \par Differential diagnosis and plan of care discussed with patient after the evaluation. \par Counseling on diet, exercise, and medication compliance was done.\par Counseling on smoking and alcohol cessation was offered when appropriate.\par Pain assessed and judicious use of narcotics when appropriate was discussed.\par Importance of fall prevention discussed.\par Advanced care planning was discussed with patient and family.\par

## 2022-12-20 NOTE — CARDIOLOGY SUMMARY
[de-identified] : Cath 12/5/2022 PCI:\par Conclusions: \par 1. Critical ostial RCA lesion with minimal reflux and 100 degrees\par focal location of calcium.\par \par 2. Ostial RCA treated with cutting balloon angioplasty followed by [facility]ABIANO\par \par \par Recommendations: \par \par 1. Maintain on DAPT with eventual plan for P2Y12/DOAC  \par 2. Given that the ischemia of his RCA has been treated, will discuss\par medical management of LAD/D1 lesion with an ischemic\par burden/symptom driven approach going forward.

## 2023-01-19 ENCOUNTER — NON-APPOINTMENT (OUTPATIENT)
Age: 88
End: 2023-01-19

## 2023-01-19 ENCOUNTER — APPOINTMENT (OUTPATIENT)
Dept: OPHTHALMOLOGY | Facility: CLINIC | Age: 88
End: 2023-01-19
Payer: MEDICARE

## 2023-01-19 PROCEDURE — 92014 COMPRE OPH EXAM EST PT 1/>: CPT

## 2023-01-19 PROCEDURE — 92133 CPTRZD OPH DX IMG PST SGM ON: CPT

## 2023-03-16 DIAGNOSIS — H40.9 UNSPECIFIED GLAUCOMA: ICD-10-CM

## 2023-03-16 RX ORDER — WARFARIN 5 MG/1
5 TABLET ORAL
Refills: 0 | Status: ACTIVE | COMMUNITY

## 2023-03-16 RX ORDER — DIGOXIN 125 UG/1
125 TABLET ORAL
Refills: 0 | Status: ACTIVE | COMMUNITY

## 2023-03-16 RX ORDER — SACUBITRIL AND VALSARTAN 24; 26 MG/1; MG/1
24-26 TABLET, FILM COATED ORAL TWICE DAILY
Qty: 180 | Refills: 2 | Status: ACTIVE | COMMUNITY
Start: 2023-03-16

## 2023-03-16 RX ORDER — LATANOPROST/PF 0.005 %
0.01 DROPS OPHTHALMIC (EYE)
Refills: 0 | Status: ACTIVE | COMMUNITY
Start: 2023-03-16

## 2023-03-16 RX ORDER — AMLODIPINE BESYLATE 5 MG/1
5 TABLET ORAL DAILY
Refills: 0 | Status: ACTIVE | COMMUNITY

## 2023-03-16 RX ORDER — CLOPIDOGREL 75 MG/1
75 TABLET, FILM COATED ORAL DAILY
Refills: 0 | Status: ACTIVE | COMMUNITY

## 2023-03-16 RX ORDER — EMPAGLIFLOZIN 25 MG/1
25 TABLET, FILM COATED ORAL
Refills: 0 | Status: ACTIVE | COMMUNITY
Start: 2023-03-16

## 2023-03-16 RX ORDER — METFORMIN HYDROCHLORIDE 500 MG/1
500 TABLET, COATED ORAL DAILY
Refills: 0 | Status: ACTIVE | COMMUNITY

## 2023-03-22 ENCOUNTER — APPOINTMENT (OUTPATIENT)
Dept: CARDIOLOGY | Facility: CLINIC | Age: 88
End: 2023-03-22

## 2023-04-13 ENCOUNTER — NON-APPOINTMENT (OUTPATIENT)
Age: 88
End: 2023-04-13

## 2023-04-13 ENCOUNTER — APPOINTMENT (OUTPATIENT)
Dept: CARDIOLOGY | Facility: CLINIC | Age: 88
End: 2023-04-13
Payer: MEDICARE

## 2023-04-13 VITALS
SYSTOLIC BLOOD PRESSURE: 132 MMHG | OXYGEN SATURATION: 96 % | WEIGHT: 130 LBS | BODY MASS INDEX: 20.89 KG/M2 | DIASTOLIC BLOOD PRESSURE: 94 MMHG | HEART RATE: 122 BPM | HEIGHT: 66 IN

## 2023-04-13 PROCEDURE — 93000 ELECTROCARDIOGRAM COMPLETE: CPT

## 2023-04-13 PROCEDURE — 99215 OFFICE O/P EST HI 40 MIN: CPT

## 2023-04-13 NOTE — DISCUSSION/SUMMARY
[FreeTextEntry1] : 86 y/o M with PMHx of AFIB on coumadin,  HLD, DM, HTN with recent osital RCA PCi. Doing well without any chest pain and access site well healed. Patient to continue clopidogrel 75 mg daily and Coumadin 5 mg daily. Plan for medical management of LAD at this time given no ischemia over that area on recent NST and no further symptoms. If develop chest pain on optimal medical management, may consider LAD intervention. Patient to follow with cardiology Dr. Lopez and Dr. Monsalve. \par \par Differential diagnosis and plan of care discussed with patient after the evaluation. \par Counseling on diet, exercise, and medication compliance was done.\par Counseling on smoking and alcohol cessation was offered when appropriate.\par Pain assessed and judicious use of narcotics when appropriate was discussed.\par Importance of fall prevention discussed.\par Advanced care planning was discussed with patient and family.\par  [EKG obtained to assist in diagnosis and management of assessed problem(s)] : EKG obtained to assist in diagnosis and management of assessed problem(s)

## 2023-04-13 NOTE — CARDIOLOGY SUMMARY
[de-identified] : Cath 12/5/2022 PCI:\par Conclusions: \par 1. Critical ostial RCA lesion with minimal reflux and 100 degrees\par focal location of calcium.\par \par 2. Ostial RCA treated with cutting balloon angioplasty followed by [facility]ABIANO\par \par \par Recommendations: \par \par 1. Maintain on DAPT with eventual plan for P2Y12/DOAC  \par 2. Given that the ischemia of his RCA has been treated, will discuss\par medical management of LAD/D1 lesion with an ischemic\par burden/symptom driven approach going forward.

## 2023-04-13 NOTE — HISTORY OF PRESENT ILLNESS
[FreeTextEntry1] : 86 y/o M with PMHx of AFIB on coumadin last dose 12/1( bridged with Lovenox 60 SQ BID till sunday AM ),  HLD, DM, HTN presents today for follow up after recent high risk PCi to ostial RCA (which was what was signifcant on NST). No chest pain and is able to do all activities without any issues. Takng clopidogrel and Coumadin now. INR 2 today. \par Follows at coumadin clinic this morning. \par Overall he is quite active but recently developed carpal tunnel which limits his weight training.

## 2023-05-09 ENCOUNTER — APPOINTMENT (OUTPATIENT)
Dept: OPHTHALMOLOGY | Facility: CLINIC | Age: 88
End: 2023-05-09

## 2023-07-27 ENCOUNTER — APPOINTMENT (OUTPATIENT)
Dept: CARDIOLOGY | Facility: CLINIC | Age: 88
End: 2023-07-27

## 2024-09-09 NOTE — ASU PATIENT PROFILE, ADULT - MEDICATIONS TO HOLD
Hold Metformin morning of procedure (11/23) (takes one a day), last dose of coumadin was on 11/18 (will continue to hold), pt was on lovenox (last dose 11/22 7am), instructed to hold HCTZ morning of procedure (11/23)
normal bilaterally